# Patient Record
Sex: FEMALE | Race: WHITE | Employment: FULL TIME | URBAN - METROPOLITAN AREA
[De-identification: names, ages, dates, MRNs, and addresses within clinical notes are randomized per-mention and may not be internally consistent; named-entity substitution may affect disease eponyms.]

---

## 2017-03-22 ENCOUNTER — OFFICE VISIT (OUTPATIENT)
Dept: ENT CLINIC | Age: 32
End: 2017-03-22

## 2017-03-22 VITALS
HEART RATE: 89 BPM | WEIGHT: 145 LBS | HEIGHT: 67 IN | DIASTOLIC BLOOD PRESSURE: 80 MMHG | BODY MASS INDEX: 22.76 KG/M2 | SYSTOLIC BLOOD PRESSURE: 115 MMHG

## 2017-03-22 DIAGNOSIS — M79.18 MYOFACIAL MUSCLE PAIN: Primary | ICD-10-CM

## 2017-03-22 DIAGNOSIS — M79.18 MYOFASCIAL PAIN: Primary | ICD-10-CM

## 2017-03-22 DIAGNOSIS — H92.09 OTALGIA, UNSPECIFIED LATERALITY: ICD-10-CM

## 2017-03-22 PROCEDURE — 99214 OFFICE O/P EST MOD 30 MIN: CPT | Performed by: OTOLARYNGOLOGY

## 2017-03-22 PROCEDURE — 92567 TYMPANOMETRY: CPT | Performed by: AUDIOLOGIST

## 2017-03-22 PROCEDURE — 92557 COMPREHENSIVE HEARING TEST: CPT | Performed by: AUDIOLOGIST

## 2017-03-22 RX ORDER — DEXTROAMPHETAMINE SACCHARATE, AMPHETAMINE ASPARTATE, DEXTROAMPHETAMINE SULFATE AND AMPHETAMINE SULFATE 7.5; 7.5; 7.5; 7.5 MG/1; MG/1; MG/1; MG/1
TABLET ORAL
COMMUNITY
Start: 2017-03-14 | End: 2021-09-30

## 2018-08-16 ENCOUNTER — HOSPITAL ENCOUNTER (OUTPATIENT)
Dept: OTHER | Age: 33
Discharge: HOME | End: 2018-08-16
Attending: NURSE PRACTITIONER | Admitting: NURSE PRACTITIONER

## 2018-08-16 DIAGNOSIS — S09.92XS INJURY OF NOSE, SEQUELA: ICD-10-CM

## 2018-08-27 ENCOUNTER — OFFICE VISIT (OUTPATIENT)
Dept: ENT CLINIC | Age: 33
End: 2018-08-27

## 2018-08-27 VITALS — HEART RATE: 76 BPM | SYSTOLIC BLOOD PRESSURE: 110 MMHG | DIASTOLIC BLOOD PRESSURE: 68 MMHG | OXYGEN SATURATION: 98 %

## 2018-08-27 DIAGNOSIS — J34.89 NASAL OBSTRUCTION: Primary | ICD-10-CM

## 2018-08-27 PROCEDURE — 96372 THER/PROPH/DIAG INJ SC/IM: CPT | Performed by: OTOLARYNGOLOGY

## 2018-08-27 PROCEDURE — 1036F TOBACCO NON-USER: CPT | Performed by: OTOLARYNGOLOGY

## 2018-08-27 PROCEDURE — G8421 BMI NOT CALCULATED: HCPCS | Performed by: OTOLARYNGOLOGY

## 2018-08-27 PROCEDURE — G8427 DOCREV CUR MEDS BY ELIG CLIN: HCPCS | Performed by: OTOLARYNGOLOGY

## 2018-08-27 PROCEDURE — 99213 OFFICE O/P EST LOW 20 MIN: CPT | Performed by: OTOLARYNGOLOGY

## 2018-08-27 RX ORDER — ALPRAZOLAM 1 MG/1
1 TABLET ORAL NIGHTLY PRN
COMMUNITY
End: 2021-09-30

## 2018-08-27 RX ORDER — METHYLPREDNISOLONE ACETATE 40 MG/ML
40 INJECTION, SUSPENSION INTRA-ARTICULAR; INTRALESIONAL; INTRAMUSCULAR; SOFT TISSUE ONCE
Status: COMPLETED | OUTPATIENT
Start: 2018-08-27 | End: 2018-08-27

## 2018-08-27 RX ADMIN — METHYLPREDNISOLONE ACETATE 40 MG: 40 INJECTION, SUSPENSION INTRA-ARTICULAR; INTRALESIONAL; INTRAMUSCULAR; SOFT TISSUE at 15:54

## 2018-08-28 NOTE — PROGRESS NOTES
The patient is a generally healthy 72-year-old who sustained blunt trauma to the midface approximate 2 weeks ago. Approximately 4 days after the injury, when she continued to notice right-sided nasal blockage, she was seen in an ER setting. Radiographs at that time showed that her nasal bone was intact. No further treatment was recommended at that time. The patient has no past rhinologic history either medical or surgical.  There had been no obstructive symptoms prior to the trauma. Since that time however she feels a block sensation on the right that is constant and not related to environmental or position. She has not used over-the-counter medications either oral or topical.  There has been no change in her sense of olfaction. She is also complaining of right sided facial pressure, particularly in the region of the nasal maxillary groove. She denies any autophony or eye symptoms. She does not smoke and is not exposed to fumes or chemicals. There has a past history of TMJ discomfort. This has not been present to her knowledge for approximately a year. She denies any related dental disease. Exam:    The patient appears well developed and well nourished.; oriented to person, place, and time. The head is normocephalic and atraumatic; no facial scars or weakness are noted. The facial skeleton is normal.The voice has a normal quality and tonality to it. Eyes: Conjunctivae and lids normal. Full EOM. The skin is warm and dry. No pallor. The pinnae are normal in appearance. Otoscopy reveals clear ear canals without cerumen or keratin debris. Both eardrums are normal with good aeration of the middle ear space. There is no attic retraction, and a normal light reflex. The external nose is unremarkable including the dorsum, columella, nasion, and alae. The turbinates respond well to vasoconstriction. The middle and inferior meatuses appeared clear.  There is no polyp,

## 2018-09-04 ENCOUNTER — TELEPHONE (OUTPATIENT)
Dept: ENT CLINIC | Age: 33
End: 2018-09-04

## 2018-09-04 DIAGNOSIS — J34.89 NASAL OBSTRUCTION: Primary | ICD-10-CM

## 2018-09-04 NOTE — TELEPHONE ENCOUNTER
Patient was told to call back in a week regarding her breathing out of her right nostril. She was seen in the office on  8/27/18. She states her breathing is not any better. Please advise.

## 2018-09-05 ENCOUNTER — TELEPHONE (OUTPATIENT)
Dept: ENT CLINIC | Age: 33
End: 2018-09-05

## 2018-09-06 NOTE — TELEPHONE ENCOUNTER
Attempted to call patient. Left message on machine that CAT of nose and sinus needs to be done, order in chart. Central Scheduling phone number given to call and schedule above test.    Call office with questions.

## 2021-04-20 ENCOUNTER — HOSPITAL ENCOUNTER (OUTPATIENT)
Age: 36
Discharge: HOME | End: 2021-04-20
Payer: COMMERCIAL

## 2021-04-20 ENCOUNTER — HOSPITAL ENCOUNTER (OUTPATIENT)
Dept: GENERAL RADIOLOGY | Age: 36
Discharge: HOME | End: 2021-04-20
Payer: COMMERCIAL

## 2021-04-20 DIAGNOSIS — R06.00 DYSPNEA, UNSPECIFIED TYPE: ICD-10-CM

## 2021-04-20 PROCEDURE — 71046 X-RAY EXAM CHEST 2 VIEWS: CPT

## 2021-09-27 ENCOUNTER — HOSPITAL ENCOUNTER (OUTPATIENT)
Dept: ULTRASOUND IMAGING | Age: 36
Discharge: HOME | End: 2021-09-27
Payer: COMMERCIAL

## 2021-09-27 ENCOUNTER — HOSPITAL ENCOUNTER (EMERGENCY)
Age: 36
Discharge: HOME | End: 2021-09-28
Attending: EMERGENCY MEDICINE
Payer: COMMERCIAL

## 2021-09-27 DIAGNOSIS — R10.9 ABDOMINAL PAIN, UNSPECIFIED ABDOMINAL LOCATION: ICD-10-CM

## 2021-09-27 DIAGNOSIS — R10.13 ABDOMINAL PAIN, EPIGASTRIC: Primary | ICD-10-CM

## 2021-09-27 LAB
A/G RATIO: 1.6 (ref 1.1–2.2)
ALBUMIN SERPL-MCNC: 4.6 G/DL (ref 3.4–5)
ALP BLD-CCNC: 65 U/L (ref 40–129)
ALT SERPL-CCNC: 17 U/L (ref 10–40)
ANION GAP SERPL CALCULATED.3IONS-SCNC: 13 MMOL/L (ref 3–16)
AST SERPL-CCNC: 19 U/L (ref 15–37)
BASOPHILS ABSOLUTE: 0.1 K/UL (ref 0–0.2)
BASOPHILS RELATIVE PERCENT: 0.7 %
BILIRUB SERPL-MCNC: 0.4 MG/DL (ref 0–1)
BILIRUBIN URINE: ABNORMAL
BLOOD, URINE: NEGATIVE
BUN BLDV-MCNC: 10 MG/DL (ref 7–20)
CALCIUM SERPL-MCNC: 9.5 MG/DL (ref 8.3–10.6)
CHLORIDE BLD-SCNC: 103 MMOL/L (ref 99–110)
CLARITY: CLEAR
CO2: 22 MMOL/L (ref 21–32)
COLOR: YELLOW
CREAT SERPL-MCNC: 0.7 MG/DL (ref 0.6–1.1)
EOSINOPHILS ABSOLUTE: 1.4 K/UL (ref 0–0.6)
EOSINOPHILS RELATIVE PERCENT: 12 %
GFR AFRICAN AMERICAN: >60
GFR NON-AFRICAN AMERICAN: >60
GLOBULIN: 2.9 G/DL
GLUCOSE BLD-MCNC: 106 MG/DL (ref 70–99)
GLUCOSE URINE: NEGATIVE MG/DL
HCG QUALITATIVE: NEGATIVE
HCT VFR BLD CALC: 41.7 % (ref 36–48)
HEMOGLOBIN: 14 G/DL (ref 12–16)
KETONES, URINE: ABNORMAL MG/DL
LEUKOCYTE ESTERASE, URINE: NEGATIVE
LIPASE: 39 U/L (ref 13–60)
LYMPHOCYTES ABSOLUTE: 4.1 K/UL (ref 1–5.1)
LYMPHOCYTES RELATIVE PERCENT: 34.4 %
MCH RBC QN AUTO: 29.6 PG (ref 26–34)
MCHC RBC AUTO-ENTMCNC: 33.6 G/DL (ref 31–36)
MCV RBC AUTO: 88.2 FL (ref 80–100)
MICROSCOPIC EXAMINATION: ABNORMAL
MONOCYTES ABSOLUTE: 0.9 K/UL (ref 0–1.3)
MONOCYTES RELATIVE PERCENT: 7.7 %
NEUTROPHILS ABSOLUTE: 5.3 K/UL (ref 1.7–7.7)
NEUTROPHILS RELATIVE PERCENT: 45.2 %
NITRITE, URINE: NEGATIVE
PDW BLD-RTO: 13.8 % (ref 12.4–15.4)
PH UA: 6 (ref 5–8)
PLATELET # BLD: 348 K/UL (ref 135–450)
PMV BLD AUTO: 7.5 FL (ref 5–10.5)
POTASSIUM REFLEX MAGNESIUM: 3.8 MMOL/L (ref 3.5–5.1)
PROTEIN UA: NEGATIVE MG/DL
RBC # BLD: 4.73 M/UL (ref 4–5.2)
SODIUM BLD-SCNC: 138 MMOL/L (ref 136–145)
SPECIFIC GRAVITY UA: 1.03 (ref 1–1.03)
TOTAL PROTEIN: 7.5 G/DL (ref 6.4–8.2)
URINE REFLEX TO CULTURE: ABNORMAL
URINE TYPE: ABNORMAL
UROBILINOGEN, URINE: 0.2 E.U./DL
WBC # BLD: 11.8 K/UL (ref 4–11)

## 2021-09-27 PROCEDURE — 81003 URINALYSIS AUTO W/O SCOPE: CPT

## 2021-09-27 PROCEDURE — 2500000003 HC RX 250 WO HCPCS: Performed by: EMERGENCY MEDICINE

## 2021-09-27 PROCEDURE — 76700 US EXAM ABDOM COMPLETE: CPT

## 2021-09-27 PROCEDURE — 80053 COMPREHEN METABOLIC PANEL: CPT

## 2021-09-27 PROCEDURE — 96374 THER/PROPH/DIAG INJ IV PUSH: CPT

## 2021-09-27 PROCEDURE — 84703 CHORIONIC GONADOTROPIN ASSAY: CPT

## 2021-09-27 PROCEDURE — 2580000003 HC RX 258: Performed by: EMERGENCY MEDICINE

## 2021-09-27 PROCEDURE — 85025 COMPLETE CBC W/AUTO DIFF WBC: CPT

## 2021-09-27 PROCEDURE — 99282 EMERGENCY DEPT VISIT SF MDM: CPT

## 2021-09-27 PROCEDURE — 6360000002 HC RX W HCPCS: Performed by: EMERGENCY MEDICINE

## 2021-09-27 PROCEDURE — 96375 TX/PRO/DX INJ NEW DRUG ADDON: CPT

## 2021-09-27 PROCEDURE — 83690 ASSAY OF LIPASE: CPT

## 2021-09-27 RX ORDER — MORPHINE SULFATE 4 MG/ML
4 INJECTION, SOLUTION INTRAMUSCULAR; INTRAVENOUS ONCE
Status: COMPLETED | OUTPATIENT
Start: 2021-09-27 | End: 2021-09-27

## 2021-09-27 RX ORDER — 0.9 % SODIUM CHLORIDE 0.9 %
1000 INTRAVENOUS SOLUTION INTRAVENOUS ONCE
Status: COMPLETED | OUTPATIENT
Start: 2021-09-27 | End: 2021-09-28

## 2021-09-27 RX ORDER — ONDANSETRON 2 MG/ML
4 INJECTION INTRAMUSCULAR; INTRAVENOUS ONCE
Status: COMPLETED | OUTPATIENT
Start: 2021-09-27 | End: 2021-09-27

## 2021-09-27 RX ADMIN — ONDANSETRON 4 MG: 2 INJECTION INTRAMUSCULAR; INTRAVENOUS at 23:41

## 2021-09-27 RX ADMIN — FAMOTIDINE 20 MG: 10 INJECTION, SOLUTION INTRAVENOUS at 23:41

## 2021-09-27 RX ADMIN — MORPHINE SULFATE 4 MG: 4 INJECTION, SOLUTION INTRAMUSCULAR; INTRAVENOUS at 23:41

## 2021-09-27 RX ADMIN — SODIUM CHLORIDE 1000 ML: 9 INJECTION, SOLUTION INTRAVENOUS at 23:40

## 2021-09-27 ASSESSMENT — PAIN DESCRIPTION - LOCATION: LOCATION: ABDOMEN

## 2021-09-27 ASSESSMENT — PAIN SCALES - GENERAL
PAINLEVEL_OUTOF10: 6
PAINLEVEL_OUTOF10: 4

## 2021-09-27 ASSESSMENT — ENCOUNTER SYMPTOMS
CONSTIPATION: 0
COLOR CHANGE: 0
DIARRHEA: 0
SHORTNESS OF BREATH: 0
EYE PAIN: 0
NAUSEA: 1
CHEST TIGHTNESS: 0
COUGH: 0
VOMITING: 1
ABDOMINAL PAIN: 1
BLOOD IN STOOL: 0

## 2021-09-27 ASSESSMENT — PAIN - FUNCTIONAL ASSESSMENT: PAIN_FUNCTIONAL_ASSESSMENT: ACTIVITIES ARE NOT PREVENTED

## 2021-09-27 ASSESSMENT — PAIN DESCRIPTION - ORIENTATION: ORIENTATION: MID

## 2021-09-27 ASSESSMENT — PAIN DESCRIPTION - ONSET: ONSET: ON-GOING

## 2021-09-27 ASSESSMENT — PAIN DESCRIPTION - DESCRIPTORS: DESCRIPTORS: ACHING

## 2021-09-27 ASSESSMENT — PAIN DESCRIPTION - FREQUENCY: FREQUENCY: CONTINUOUS

## 2021-09-27 ASSESSMENT — PAIN DESCRIPTION - PAIN TYPE: TYPE: VISCERAL PAIN

## 2021-09-27 ASSESSMENT — PAIN DESCRIPTION - PROGRESSION: CLINICAL_PROGRESSION: NOT CHANGED

## 2021-09-28 VITALS
SYSTOLIC BLOOD PRESSURE: 106 MMHG | HEART RATE: 81 BPM | OXYGEN SATURATION: 100 % | TEMPERATURE: 98.4 F | WEIGHT: 146.83 LBS | BODY MASS INDEX: 23.04 KG/M2 | DIASTOLIC BLOOD PRESSURE: 71 MMHG | HEIGHT: 67 IN | RESPIRATION RATE: 19 BRPM

## 2021-09-28 RX ORDER — HYDROCODONE BITARTRATE AND ACETAMINOPHEN 5; 325 MG/1; MG/1
1 TABLET ORAL EVERY 4 HOURS PRN
Qty: 18 TABLET | Refills: 0 | Status: SHIPPED | OUTPATIENT
Start: 2021-09-28 | End: 2021-10-01

## 2021-09-28 RX ORDER — ONDANSETRON 4 MG/1
4 TABLET, FILM COATED ORAL EVERY 8 HOURS PRN
Qty: 20 TABLET | Refills: 0 | Status: SHIPPED | OUTPATIENT
Start: 2021-09-28 | End: 2021-10-29

## 2021-09-28 NOTE — ED PROVIDER NOTES
eMERGENCY dEPARTMENT eNCOUnter      Pt Name: Krista Fulton  MRN: [id number]  [place] 1985  Date of evaluation: 9/27/2021  Provider: Christine Aguilar MD     [address] [place] [address]       Chief Complaint   Patient presents with    Abdominal Pain     onset 6 days ago.  n/v denies diarrhea. pt wtih US this am.  pt here for continued eval          HISTORY OF PRESENT ILLNESS   (Location/Symptom, Timing/Onset,Context/Setting, Quality, Duration, Modifying Factors, Severity) Note limiting factors. Alireza Bazzi is a 32 y/o female with PMH of PCOS who has had abdominal pain for the past 6 days. Her pain became excruciating today and she states she cannot eat or drink anything. She saw her PCP this morning and tested positive for h.pylori. Her abdominal pain is in the epigastric area, is burning in nature, and slightly shoots to her back. Pain is exacerbated about an hour after eating food. She states nothing makes it better. At rest her pain is a 6/10 and with food its a 10/10. She has surgical history of her right ovary being removed. She denies diarrhea, constipation, fever, chest pain, and shortness of breath. She does not drink alcohol regularly, stating she only has 6 drinks every month, does not smoke, and has no history of GB or kidney stones. Nursing Notes were reviewed. REVIEW OFSYSTEMS    (2+ for level 4; 10+ for level 5)   Review of Systems   Constitutional: Positive for appetite change. Negative for fever. HENT: Negative for congestion, ear discharge and ear pain. Eyes: Negative for pain and visual disturbance. Respiratory: Negative for cough, chest tightness and shortness of breath. Cardiovascular: Negative for chest pain. Gastrointestinal: Positive for abdominal pain, nausea and vomiting. Negative for blood in stool, constipation and diarrhea. Denies hematemesis    Genitourinary: Negative for difficulty urinating, dyspareunia, menstrual problem, pelvic pain and vaginal bleeding.    Skin: Negative for color change and rash. PAST MEDICAL HISTORY     Past Medical History:   Diagnosis Date    Anxiety     Asthma        SURGICAL HISTORY       Past Surgical History:   Procedure Laterality Date    OVARY REMOVAL         CURRENT MEDICATIONS       Discharge Medication List as of 9/28/2021  1:14 AM      CONTINUE these medications which have NOT CHANGED    Details   ALPRAZolam (XANAX) 1 MG tablet Take 1 mg by mouth nightly as needed for Sleep. Agathadeanna Bensonisiss Historical Med      amphetamine-dextroamphetamine (ADDERALL) 30 MG tablet . Historical Med             ALLERGIES     Ceclor [cefaclor], Cephalosporins, and Codeine    FAMILY HISTORY     No family history on file. SOCIAL HISTORY       Social History     Socioeconomic History    Marital status:      Spouse name: Not on file    Number of children: Not on file    Years of education: Not on file    Highest education level: Not on file   Occupational History    Not on file   Tobacco Use    Smoking status: Former Smoker    Smokeless tobacco: Never Used    Tobacco comment: when pt was 20    Substance and Sexual Activity    Alcohol use: Yes     Comment: occasional    Drug use: No    Sexual activity: Not on file   Other Topics Concern    Not on file   Social History Narrative    Not on file     Social Determinants of Health     Financial Resource Strain:     Difficulty of Paying Living Expenses:    Food Insecurity:     Worried About [address] in the Last Year:     [address] in the Last Year:    Transportation Needs:     Lack of Transportation (Medical):      Lack of Transportation (Non-Medical):    Physical Activity:     Days of Exercise per Week:     Minutes of Exercise per Session:    Stress:     Feeling of Stress :    Social Connections:     Frequency of Communication with Friends and Family:     Frequency of Social Gatherings with Friends and Family:     Attends Christianity Services:     Active Member of Clubs or Organizations:     Attends Club or Organization Meetings:     Marital Status:    Intimate Partner Violence:     Fear of Current or Ex-Partner:     Emotionally Abused:     Physically Abused:     Sexually Abused:        SCREENINGS           PHYSICAL EXAM    (up to 7 for level 4, 8 or more for level 5)     ED Triage Vitals   BP Temp Temp Source Pulse Resp SpO2 Height Weight   09/27/21 1851 09/27/21 1851 09/27/21 1851 09/27/21 1851 09/27/21 1851 09/27/21 1851 09/27/21 1845 09/27/21 1845   119/88 98.9 °F (37.2 °C) Tympanic 84 17 99 % 5' 7\" (1.702 m) 146 lb 13.2 oz (66.6 kg)       Physical Exam  Constitutional:       Appearance: She is normal weight. Cardiovascular:      Rate and Rhythm: Normal rate and regular rhythm. Heart sounds: Normal heart sounds. Pulmonary:      Effort: Pulmonary effort is normal. No respiratory distress. Breath sounds: Normal breath sounds. No wheezing. Abdominal:      General: Bowel sounds are normal. There is no distension or abdominal bruit. There are no signs of injury. Palpations: Abdomen is soft. There is no splenomegaly or mass. Tenderness: There is abdominal tenderness in the right upper quadrant, epigastric area and left upper quadrant. There is no guarding or rebound. Negative signs include Hardy's sign, Rovsing's sign, McBurney's sign, psoas sign and obturator sign. Genitourinary:     Uterus: Normal. Not tender. Adnexa: Right adnexa normal.        Right: No mass or tenderness. Left: No mass or tenderness. Skin:     General: Skin is warm and dry. Capillary Refill: Capillary refill takes less than 2 seconds. Neurological:      Mental Status: She is alert and oriented to person, place, and time. Psychiatric:         Mood and Affect: Mood normal.         Behavior: Behavior normal.             DIAGNOSTIC RESULTS     EKG (Per Emergency Physician):       RADIOLOGY (Per Emergency Physician):        Interpretation per the Radiologist below, if available at the time of this note:  No results found. ED BEDSIDE ULTRASOUND:   Performed by ED Physician - none    LABS:  Labs Reviewed   CBC WITH AUTO DIFFERENTIAL - Abnormal; Notable for the following components:       Result Value    WBC 11.8 (*)     Eosinophils Absolute 1.4 (*)     All other components within normal limits    Narrative:     Performed at:  [address] [facility] [place] [address]   Phone [phone]   COMPREHENSIVE METABOLIC PANEL W/ REFLEX TO MG FOR LOW K - Abnormal; Notable for the following components:    Glucose 106 (*)     All other components within normal limits    Narrative:     Performed at:  [address] [place] [address]   Phone [phone]   URINE RT REFLEX TO CULTURE - Abnormal; Notable for the following components:    Bilirubin Urine SMALL (*)     Ketones, Urine TRACE (*)     All other components within normal limits    Narrative:     Performed at:  [address] [facility] [place] [address]   Phone [phone]   HCG, SERUM, QUALITATIVE    Narrative:     Performed at:  [address] [facility] [place] [address]   Phone [phone]   LIPASE    Narrative:     Performed at:  [address] [place] [address] [place] [address]   Phone [phone]        All other labs were within normal range or not returned as of this dictation.       Procedures      EMERGENCY DEPARTMENT COURSE and DIFFERENTIAL DIAGNOSIS/MDM:   Vitals:    Vitals:    09/27/21 1845 09/27/21 1851 09/28/21 0108   BP:  119/88 106/71   Pulse:  84 81   Resp:  17 19   Temp:  98.9 °F (37.2 °C) 98.4 °F (36.9 °C)   TempSrc:  Tympanic    SpO2:  99% 100%   Weight: 146 lb 13.2 oz (66.6 kg)     Height: 5' 7\" (1.702 m)         Medications   0.9 % sodium chloride bolus (0 mLs IntraVENous Stopped 9/28/21 0107)   ondansetron (ZOFRAN) injection 4 mg (4 mg IntraVENous Given 9/27/21 2341)   morphine (PF) injection 4 mg (4 mg IntraVENous Given 9/27/21 2341)   famotidine (PEPCID) injection 20 mg (20 mg IntraVENous Given 9/27/21 2341)       [facility]. Patient is a 72-year-old female with history of polycystic ovarian disease presents with right upper quadrant abdominal pain. Its been going on for several days but seen by family physician diagnosed with H pylori and was placed on antibiotics patient had ultrasound which shows some sludge in the gallbladder but no stones no evidence of acute cholecystitis. Has a hemangioma of the liver. Patient came in with nausea vomiting unable to eat or drink. Patient was given IV fluids IV Zofran and pain medication with relief. Patient tolerating p.o. fluids and will be discharged. REVAL:         CRITICAL CARE TIME   Total CriticalCare time was 0 minutes, excluding separately reportable procedures. There was a high probability of clinically significant/life threatening deterioration in the patient's condition which required my urgent intervention. CONSULTS:  None    PROCEDURES:  Unless otherwise noted below, none     [unfilled]    FINAL IMPRESSION      1. Abdominal pain, epigastric          DISPOSITION/PLAN   DISPOSITION        PATIENT REFERRED TO:  Salinas Menon, APRN - CNP  [address] [place]  [phone]    Schedule an appointment as soon as possible for a visit in 1 week  If symptoms worsen    Khalif Vance MD  [address] [place] [address]  [phone]    Schedule an appointment as soon as possible for a visit in 2 days  If symptoms worsen      DISCHARGE MEDICATIONS:  Discharge Medication List as of 9/28/2021  1:14 AM      START taking these medications    Details   ondansetron (ZOFRAN) 4 MG tablet Take 1 tablet by mouth every 8 hours as needed for Nausea, Disp-20 tablet, R-0Print HYDROcodone-acetaminophen (NORCO) 5-325 MG per tablet Take 1 tablet by mouth every 4 hours as needed for Pain for up to 3 days. Intended supply: 3 days. Take lowest dose possible to manage pain, Disp-18 tablet, R-0Print                (Please note:  Portions of this note were completed with a voice recognition program.Efforts were made to edit the dictations but occasionally words and phrases are mis-transcribed.)  Form v2016. J.5-cn    David MARSHALL MD (electronically signed)  Emergency Medicine Provider        Joel James MD  09/30/21 7348

## 2021-09-28 NOTE — ED NOTES
Pt here with CC of mid abdominal pain that has been intermittent over the past week. But has worsened over the past 2-3 days and become more consistent. States that it does worsen after eating. She has been vomiting, no diarrhea.     Had US this AM.    ED MD @ bedside      Archana Morgan RN  09/27/21 9279

## 2021-09-30 ENCOUNTER — HOSPITAL ENCOUNTER (OUTPATIENT)
Age: 36
Setting detail: OBSERVATION
Discharge: HOME | End: 2021-10-01
Attending: EMERGENCY MEDICINE | Admitting: SURGERY
Payer: COMMERCIAL

## 2021-09-30 ENCOUNTER — APPOINTMENT (OUTPATIENT)
Dept: CT IMAGING | Age: 36
End: 2021-09-30
Payer: COMMERCIAL

## 2021-09-30 ENCOUNTER — APPOINTMENT (OUTPATIENT)
Dept: ULTRASOUND IMAGING | Age: 36
End: 2021-09-30
Payer: COMMERCIAL

## 2021-09-30 DIAGNOSIS — R10.11 RIGHT UPPER QUADRANT ABDOMINAL PAIN: Primary | ICD-10-CM

## 2021-09-30 DIAGNOSIS — K81.9 CHOLECYSTITIS: ICD-10-CM

## 2021-09-30 LAB
ALBUMIN SERPL-MCNC: 4.9 G/DL (ref 3.4–5)
ALP BLD-CCNC: 63 U/L (ref 40–129)
ALT SERPL-CCNC: 14 U/L (ref 10–40)
ANION GAP SERPL CALCULATED.3IONS-SCNC: 16 MMOL/L (ref 3–16)
AST SERPL-CCNC: 14 U/L (ref 15–37)
BASOPHILS ABSOLUTE: 0 K/UL (ref 0–0.2)
BASOPHILS RELATIVE PERCENT: 0.5 %
BILIRUB SERPL-MCNC: 0.6 MG/DL (ref 0–1)
BILIRUBIN DIRECT: <0.2 MG/DL (ref 0–0.3)
BILIRUBIN URINE: NEGATIVE
BILIRUBIN, INDIRECT: ABNORMAL MG/DL (ref 0–1)
BLOOD, URINE: NEGATIVE
BUN BLDV-MCNC: 10 MG/DL (ref 7–20)
CALCIUM SERPL-MCNC: 9.8 MG/DL (ref 8.3–10.6)
CHLORIDE BLD-SCNC: 102 MMOL/L (ref 99–110)
CLARITY: CLEAR
CO2: 21 MMOL/L (ref 21–32)
COLOR: YELLOW
CREAT SERPL-MCNC: 0.8 MG/DL (ref 0.6–1.1)
EKG ATRIAL RATE: 94 BPM
EKG DIAGNOSIS: NORMAL
EKG P AXIS: 69 DEGREES
EKG P-R INTERVAL: 126 MS
EKG Q-T INTERVAL: 330 MS
EKG QRS DURATION: 86 MS
EKG QTC CALCULATION (BAZETT): 412 MS
EKG R AXIS: 82 DEGREES
EKG T AXIS: 56 DEGREES
EKG VENTRICULAR RATE: 94 BPM
EOSINOPHILS ABSOLUTE: 1 K/UL (ref 0–0.6)
EOSINOPHILS RELATIVE PERCENT: 12.6 %
GFR AFRICAN AMERICAN: >60
GFR NON-AFRICAN AMERICAN: >60
GLUCOSE BLD-MCNC: 124 MG/DL (ref 70–99)
GLUCOSE URINE: NEGATIVE MG/DL
HCG(URINE) PREGNANCY TEST: NEGATIVE
HCT VFR BLD CALC: 41.4 % (ref 36–48)
HEMOGLOBIN: 13.9 G/DL (ref 12–16)
KETONES, URINE: 15 MG/DL
LACTIC ACID, SEPSIS: 1.8 MMOL/L (ref 0.4–1.9)
LEUKOCYTE ESTERASE, URINE: NEGATIVE
LIPASE: 58 U/L (ref 13–60)
LYMPHOCYTES ABSOLUTE: 2.4 K/UL (ref 1–5.1)
LYMPHOCYTES RELATIVE PERCENT: 31.4 %
MCH RBC QN AUTO: 29.7 PG (ref 26–34)
MCHC RBC AUTO-ENTMCNC: 33.5 G/DL (ref 31–36)
MCV RBC AUTO: 88.8 FL (ref 80–100)
MICROSCOPIC EXAMINATION: ABNORMAL
MONOCYTES ABSOLUTE: 0.4 K/UL (ref 0–1.3)
MONOCYTES RELATIVE PERCENT: 5.9 %
NEUTROPHILS ABSOLUTE: 3.8 K/UL (ref 1.7–7.7)
NEUTROPHILS RELATIVE PERCENT: 49.6 %
NITRITE, URINE: NEGATIVE
PDW BLD-RTO: 13.7 % (ref 12.4–15.4)
PH UA: 6.5 (ref 5–8)
PLATELET # BLD: 322 K/UL (ref 135–450)
PMV BLD AUTO: 7.4 FL (ref 5–10.5)
POTASSIUM REFLEX MAGNESIUM: 4.2 MMOL/L (ref 3.5–5.1)
PROTEIN UA: NEGATIVE MG/DL
RBC # BLD: 4.66 M/UL (ref 4–5.2)
SARS-COV-2, NAAT: NOT DETECTED
SODIUM BLD-SCNC: 139 MMOL/L (ref 136–145)
SPECIFIC GRAVITY UA: >1.03 (ref 1–1.03)
TOTAL PROTEIN: 8.1 G/DL (ref 6.4–8.2)
URINE REFLEX TO CULTURE: ABNORMAL
URINE TYPE: ABNORMAL
UROBILINOGEN, URINE: 0.2 E.U./DL
WBC # BLD: 7.6 K/UL (ref 4–11)

## 2021-09-30 PROCEDURE — 93005 ELECTROCARDIOGRAM TRACING: CPT | Performed by: PHYSICIAN ASSISTANT

## 2021-09-30 PROCEDURE — G0378 HOSPITAL OBSERVATION PER HR: HCPCS

## 2021-09-30 PROCEDURE — 93010 ELECTROCARDIOGRAM REPORT: CPT | Performed by: INTERNAL MEDICINE

## 2021-09-30 PROCEDURE — 83605 ASSAY OF LACTIC ACID: CPT

## 2021-09-30 PROCEDURE — 2580000003 HC RX 258: Performed by: PHYSICIAN ASSISTANT

## 2021-09-30 PROCEDURE — 6360000002 HC RX W HCPCS: Performed by: SURGERY

## 2021-09-30 PROCEDURE — 6360000002 HC RX W HCPCS: Performed by: PHYSICIAN ASSISTANT

## 2021-09-30 PROCEDURE — 36415 COLL VENOUS BLD VENIPUNCTURE: CPT

## 2021-09-30 PROCEDURE — 87635 SARS-COV-2 COVID-19 AMP PRB: CPT

## 2021-09-30 PROCEDURE — 96375 TX/PRO/DX INJ NEW DRUG ADDON: CPT

## 2021-09-30 PROCEDURE — 85025 COMPLETE CBC W/AUTO DIFF WBC: CPT

## 2021-09-30 PROCEDURE — 96376 TX/PRO/DX INJ SAME DRUG ADON: CPT

## 2021-09-30 PROCEDURE — 76830 TRANSVAGINAL US NON-OB: CPT

## 2021-09-30 PROCEDURE — 6360000004 HC RX CONTRAST MEDICATION: Performed by: EMERGENCY MEDICINE

## 2021-09-30 PROCEDURE — 83690 ASSAY OF LIPASE: CPT

## 2021-09-30 PROCEDURE — 84703 CHORIONIC GONADOTROPIN ASSAY: CPT

## 2021-09-30 PROCEDURE — 94150 VITAL CAPACITY TEST: CPT

## 2021-09-30 PROCEDURE — 2580000003 HC RX 258: Performed by: SURGERY

## 2021-09-30 PROCEDURE — 99284 EMERGENCY DEPT VISIT MOD MDM: CPT

## 2021-09-30 PROCEDURE — 6370000000 HC RX 637 (ALT 250 FOR IP): Performed by: SURGERY

## 2021-09-30 PROCEDURE — 80048 BASIC METABOLIC PNL TOTAL CA: CPT

## 2021-09-30 PROCEDURE — 76856 US EXAM PELVIC COMPLETE: CPT

## 2021-09-30 PROCEDURE — 81003 URINALYSIS AUTO W/O SCOPE: CPT

## 2021-09-30 PROCEDURE — 96374 THER/PROPH/DIAG INJ IV PUSH: CPT

## 2021-09-30 PROCEDURE — 80076 HEPATIC FUNCTION PANEL: CPT

## 2021-09-30 PROCEDURE — 74177 CT ABD & PELVIS W/CONTRAST: CPT

## 2021-09-30 RX ORDER — ACETAMINOPHEN 325 MG/1
650 TABLET ORAL EVERY 6 HOURS PRN
Status: DISCONTINUED | OUTPATIENT
Start: 2021-09-30 | End: 2021-10-01 | Stop reason: HOSPADM

## 2021-09-30 RX ORDER — CLARITHROMYCIN 500 MG/1
500 TABLET, COATED ORAL 2 TIMES DAILY
COMMUNITY
Start: 2021-09-27 | End: 2021-10-06

## 2021-09-30 RX ORDER — ONDANSETRON 2 MG/ML
4 INJECTION INTRAMUSCULAR; INTRAVENOUS EVERY 6 HOURS PRN
Status: DISCONTINUED | OUTPATIENT
Start: 2021-09-30 | End: 2021-10-01 | Stop reason: HOSPADM

## 2021-09-30 RX ORDER — CIPROFLOXACIN 2 MG/ML
400 INJECTION, SOLUTION INTRAVENOUS EVERY 12 HOURS
Status: DISCONTINUED | OUTPATIENT
Start: 2021-09-30 | End: 2021-10-01

## 2021-09-30 RX ORDER — METRONIDAZOLE 500 MG/1
500 TABLET ORAL 2 TIMES DAILY
COMMUNITY
Start: 2021-09-27 | End: 2021-10-04

## 2021-09-30 RX ORDER — SODIUM CHLORIDE 9 MG/ML
INJECTION, SOLUTION INTRAVENOUS CONTINUOUS
Status: DISCONTINUED | OUTPATIENT
Start: 2021-09-30 | End: 2021-10-01 | Stop reason: HOSPADM

## 2021-09-30 RX ORDER — OMEPRAZOLE 40 MG/1
40 CAPSULE, DELAYED RELEASE ORAL DAILY
COMMUNITY
Start: 2021-09-27 | End: 2021-10-29

## 2021-09-30 RX ORDER — OXYCODONE HYDROCHLORIDE 10 MG/1
10 TABLET ORAL EVERY 4 HOURS PRN
Status: DISCONTINUED | OUTPATIENT
Start: 2021-09-30 | End: 2021-10-01 | Stop reason: HOSPADM

## 2021-09-30 RX ORDER — OXYCODONE HYDROCHLORIDE 5 MG/1
5 TABLET ORAL EVERY 4 HOURS PRN
Status: DISCONTINUED | OUTPATIENT
Start: 2021-09-30 | End: 2021-10-01 | Stop reason: HOSPADM

## 2021-09-30 RX ORDER — SODIUM CHLORIDE 0.9 % (FLUSH) 0.9 %
5-40 SYRINGE (ML) INJECTION EVERY 12 HOURS SCHEDULED
Status: DISCONTINUED | OUTPATIENT
Start: 2021-09-30 | End: 2021-10-01 | Stop reason: HOSPADM

## 2021-09-30 RX ORDER — SODIUM CHLORIDE 9 MG/ML
25 INJECTION, SOLUTION INTRAVENOUS PRN
Status: DISCONTINUED | OUTPATIENT
Start: 2021-09-30 | End: 2021-10-01 | Stop reason: HOSPADM

## 2021-09-30 RX ORDER — ONDANSETRON 4 MG/1
4 TABLET, ORALLY DISINTEGRATING ORAL EVERY 8 HOURS PRN
Status: DISCONTINUED | OUTPATIENT
Start: 2021-09-30 | End: 2021-10-01 | Stop reason: HOSPADM

## 2021-09-30 RX ORDER — SODIUM CHLORIDE 0.9 % (FLUSH) 0.9 %
5-40 SYRINGE (ML) INJECTION PRN
Status: DISCONTINUED | OUTPATIENT
Start: 2021-09-30 | End: 2021-10-01 | Stop reason: HOSPADM

## 2021-09-30 RX ORDER — MORPHINE SULFATE 2 MG/ML
2 INJECTION, SOLUTION INTRAMUSCULAR; INTRAVENOUS
Status: DISCONTINUED | OUTPATIENT
Start: 2021-09-30 | End: 2021-10-01

## 2021-09-30 RX ORDER — 0.9 % SODIUM CHLORIDE 0.9 %
1000 INTRAVENOUS SOLUTION INTRAVENOUS ONCE
Status: COMPLETED | OUTPATIENT
Start: 2021-09-30 | End: 2021-09-30

## 2021-09-30 RX ORDER — ONDANSETRON 2 MG/ML
4 INJECTION INTRAMUSCULAR; INTRAVENOUS ONCE
Status: COMPLETED | OUTPATIENT
Start: 2021-09-30 | End: 2021-09-30

## 2021-09-30 RX ADMIN — HYDROMORPHONE HYDROCHLORIDE 1 MG: 1 INJECTION, SOLUTION INTRAMUSCULAR; INTRAVENOUS; SUBCUTANEOUS at 18:01

## 2021-09-30 RX ADMIN — MORPHINE SULFATE 2 MG: 2 INJECTION, SOLUTION INTRAMUSCULAR; INTRAVENOUS at 23:41

## 2021-09-30 RX ADMIN — Medication 10 ML: at 20:19

## 2021-09-30 RX ADMIN — SODIUM CHLORIDE: 9 INJECTION, SOLUTION INTRAVENOUS at 20:20

## 2021-09-30 RX ADMIN — MORPHINE SULFATE 2 MG: 2 INJECTION, SOLUTION INTRAMUSCULAR; INTRAVENOUS at 20:18

## 2021-09-30 RX ADMIN — HYDROMORPHONE HYDROCHLORIDE 1 MG: 1 INJECTION, SOLUTION INTRAMUSCULAR; INTRAVENOUS; SUBCUTANEOUS at 15:47

## 2021-09-30 RX ADMIN — ONDANSETRON 4 MG: 2 INJECTION INTRAMUSCULAR; INTRAVENOUS at 20:18

## 2021-09-30 RX ADMIN — CIPROFLOXACIN 400 MG: 2 INJECTION, SOLUTION INTRAVENOUS at 20:21

## 2021-09-30 RX ADMIN — ONDANSETRON 4 MG: 2 INJECTION INTRAMUSCULAR; INTRAVENOUS at 13:23

## 2021-09-30 RX ADMIN — SODIUM CHLORIDE 1000 ML: 9 INJECTION, SOLUTION INTRAVENOUS at 13:24

## 2021-09-30 RX ADMIN — HYDROMORPHONE HYDROCHLORIDE 1 MG: 1 INJECTION, SOLUTION INTRAMUSCULAR; INTRAVENOUS; SUBCUTANEOUS at 13:24

## 2021-09-30 RX ADMIN — OXYCODONE HYDROCHLORIDE 10 MG: 10 TABLET ORAL at 21:19

## 2021-09-30 RX ADMIN — IOPAMIDOL 75 ML: 755 INJECTION, SOLUTION INTRAVENOUS at 13:39

## 2021-09-30 ASSESSMENT — PAIN - FUNCTIONAL ASSESSMENT
PAIN_FUNCTIONAL_ASSESSMENT: ACTIVITIES ARE NOT PREVENTED
PAIN_FUNCTIONAL_ASSESSMENT: ACTIVITIES ARE NOT PREVENTED

## 2021-09-30 ASSESSMENT — PAIN DESCRIPTION - ONSET
ONSET: ON-GOING
ONSET: ON-GOING

## 2021-09-30 ASSESSMENT — PAIN SCALES - GENERAL
PAINLEVEL_OUTOF10: 10
PAINLEVEL_OUTOF10: 6
PAINLEVEL_OUTOF10: 0
PAINLEVEL_OUTOF10: 0
PAINLEVEL_OUTOF10: 6
PAINLEVEL_OUTOF10: 8
PAINLEVEL_OUTOF10: 9
PAINLEVEL_OUTOF10: 5
PAINLEVEL_OUTOF10: 9
PAINLEVEL_OUTOF10: 10
PAINLEVEL_OUTOF10: 9

## 2021-09-30 ASSESSMENT — PAIN DESCRIPTION - PAIN TYPE
TYPE: ACUTE PAIN

## 2021-09-30 ASSESSMENT — PAIN DESCRIPTION - LOCATION
LOCATION: ABDOMEN;BACK
LOCATION: ABDOMEN
LOCATION: ABDOMEN;BACK

## 2021-09-30 ASSESSMENT — PAIN DESCRIPTION - PROGRESSION
CLINICAL_PROGRESSION: RAPIDLY WORSENING
CLINICAL_PROGRESSION: GRADUALLY IMPROVING

## 2021-09-30 ASSESSMENT — PAIN DESCRIPTION - ORIENTATION
ORIENTATION: RIGHT
ORIENTATION: RIGHT

## 2021-09-30 ASSESSMENT — PAIN DESCRIPTION - DESCRIPTORS
DESCRIPTORS: DISCOMFORT;SHARP
DESCRIPTORS: SHARP

## 2021-09-30 ASSESSMENT — PAIN DESCRIPTION - DIRECTION: RADIATING_TOWARDS: BACK

## 2021-09-30 ASSESSMENT — PAIN DESCRIPTION - FREQUENCY
FREQUENCY: CONTINUOUS
FREQUENCY: CONTINUOUS

## 2021-09-30 NOTE — ED PROVIDER NOTES
[address]        Pt Name: Adry Doss  MRN: [id number]  [place] 1985  Date of evaluation: 9/30/2021  Provider: LAXMI Horvath  PCP: JOSHUA Olsen CNP  Note Started: 1:40 PM EDT     This patient was also seen and evaluated by Attending Physician Marissa Chauhan MD.    [address] [place] [address]       Chief Complaint   Patient presents with    Abdominal Pain     pt here two days ago. diagnosed with cholecystitis. supposed to have surgery monday, but worsened pain radiating to back now. unable to eat. emesis. no fevers. HISTORY OF PRESENT ILLNESS   (Location, Timing/Onset, Context/Setting, Quality, Duration, Modifying Factors, Severity, Associated Signs and Symptoms)  Note limiting factors. Chief Complaint: Right upper quadrant abdominal pain    Adry Doss is a 31 y.o. female who presents to the emergency department with complaint of severe right upper quadrant abdominal pain radiating to the back. Patient was seen in this emergency department 3 days ago for similar pain, had a work-up including right upper quadrant ultrasound that showed nothing acute, and she was discharged with a referral for general surgery. Patient says she called our surgery service and could not get scheduled for a visit until 4 days from today, so she tried elsewhere and was able to get to see another surgeon yesterday, who has scheduled her for a cholecystectomy in 4 days. She says that this afternoon the pain got dramatically worse, and she feels like she cannot breathe because of the pain. She says it seems to be expanded, now going into her epigastric area and into her back. Denies any relevant prior history or history of abdominal surgery other than for an oophorectomy. Denies chest pain, cough. Says she has vomited a few times and has not really eaten anything over the last couple of days. No diarrhea or constipation.   No recorded fever. Nursing Notes were all reviewed and agreed with or any disagreements were addressed in the HPI. REVIEW OF SYSTEMS    (2-9 systems for level 4, 10 or more for level 5)     Review of Systems    Positives and pertinent negatives as per HPI. PAST MEDICAL HISTORY     Past Medical History:   Diagnosis Date    Anxiety     Asthma        SURGICAL HISTORY     Past Surgical History:   Procedure Laterality Date    OVARY REMOVAL         CURRENTMEDICATIONS       Previous Medications    ALPRAZOLAM (XANAX) 1 MG TABLET    Take 1 mg by mouth nightly as needed for Sleep. .    AMPHETAMINE-DEXTROAMPHETAMINE (ADDERALL) 30 MG TABLET    . HYDROCODONE-ACETAMINOPHEN (NORCO) 5-325 MG PER TABLET    Take 1 tablet by mouth every 4 hours as needed for Pain for up to 3 days. Intended supply: 3 days. Take lowest dose possible to manage pain    ONDANSETRON (ZOFRAN) 4 MG TABLET    Take 1 tablet by mouth every 8 hours as needed for Nausea       ALLERGIES     Ceclor [cefaclor], Cephalosporins, and Codeine    FAMILYHISTORY     History reviewed. No pertinent family history. SOCIAL HISTORY       Social History     Tobacco Use    Smoking status: Former Smoker    Smokeless tobacco: Never Used    Tobacco comment: when pt was 20    Substance Use Topics    Alcohol use: Yes     Comment: occasional    Drug use: No       SCREENINGS           PHYSICAL EXAM    (up to 7 for level 4, 8 or more for level 5)     ED Triage Vitals [09/30/21 1244]   BP Temp Temp Source Pulse Resp SpO2 Height Weight   (!) 139/94 98.5 °F (36.9 °C) Oral 142 16 100 % 5' 7\" (1.702 m) 147 lb 7.8 oz (66.9 kg)       Physical Exam  Vitals and nursing note reviewed. Constitutional:       General: She is in acute distress. Appearance: Normal appearance. HENT:      Head: Normocephalic and atraumatic. Nose: Nose normal.   Eyes:      General:         Right eye: No discharge. Left eye: No discharge.    Pulmonary:      Effort: Pulmonary effort is normal. No respiratory distress. Abdominal:      General: Bowel sounds are normal. There is no distension. Palpations: Abdomen is soft. There is no mass. Tenderness: There is abdominal tenderness in the right upper quadrant. There is right CVA tenderness and guarding. There is no rebound. Musculoskeletal:         General: Normal range of motion. Cervical back: Normal range of motion. Skin:     General: Skin is warm and dry. Neurological:      General: No focal deficit present. Mental Status: She is alert and oriented to person, place, and time.    Psychiatric:         Mood and Affect: Mood normal.         Behavior: Behavior normal.         DIAGNOSTIC RESULTS   LABS:    Labs Reviewed   CBC WITH AUTO DIFFERENTIAL - Abnormal; Notable for the following components:       Result Value    Eosinophils Absolute 1.0 (*)     All other components within normal limits    Narrative:     Performed at:  [place] [address]   Phone [phone]   BASIC METABOLIC PANEL W/ REFLEX TO MG FOR LOW K - Abnormal; Notable for the following components:    Glucose 124 (*)     All other components within normal limits    Narrative:     Performed at:  [place] [address]   Phone [phone]   HEPATIC FUNCTION PANEL - Abnormal; Notable for the following components:    AST 14 (*)     All other components within normal limits    Narrative:     Performed at:  [place] [address]   Phone [phone]   URINE RT REFLEX TO CULTURE - Abnormal; Notable for the following components:    Ketones, Urine 15 (*)     All other components within normal limits    Narrative:     Performed at:  [place] [address]   Phone [phone]   CULTURE, BLOOD 1 CULTURE, BLOOD 2   COVID-19, RAPID   LIPASE    Narrative:     Performed at:  [facility]  [address] [place] [address]   Phone [phone]   LACTATE, SEPSIS    Narrative:     Performed at:  [facility]  [address] [facility] [address] [facility] [address] [facility] [place] [address]   Phone [phone]   PREGNANCY, URINE    Narrative:     Performed at:  [facility]  [address] [place] [facility] [address]   Phone [phone]   LACTATE, SEPSIS   HCG, SERUM, QUALITATIVE       When ordered only abnormal lab results are displayed. All other labs were within normal range or not returned as of this dictation. EKG: When ordered, EKG's are interpreted by the Emergency Department Physician in the absence of a cardiologist.  Please see their note for interpretation of EKG. RADIOLOGY:   Non-plain film images such as CT, Ultrasound and MRI are read by the radiologist. Plain radiographic images are visualized and preliminarily interpreted by the ED Provider with the below findings:    Interpretation per the Radiologist below, if available at the time of this note:    US NON OB TRANSVAGINAL   Preliminary Result   Dermoid cyst within the right ovary, consistent with the findings of   09/30/2021. Normal arterial and venous blood flow is identified within the   right ovary. No sonographic evidence of torsion. Please see below for   specific recommendations involving the dermoid cyst.      Small uterine fibroid measuring up to 2.7 cm. Normal sonographic appearance of the left ovary, uterus, and endometrium for   patient age. RECOMMENDATIONS:   Multiple ovarian cysts. Most severe: 2 cm dermoid ovarian cyst.  Recommend   pelvic MRI w/IV contrast. If not surgically resected, pelvic US follow-up   annually.    Reference: Radiology 2010 Sep;256(3):943-54         US PELVIS COMPLETE   Preliminary Result   Dermoid cyst on the right measuring 1.9 cm maximally as noted on the earlier   CT. Surgical consultation recommended. Continued surveillance is   recommended annually if not surgically resected. Posterior fundal fibroid measuring 2.7 cm. Thickened endometrial stripe   possibly related to the stage of menstruation. Reportedly previous left oophorectomy with possible remnant tissue on the   left. Physiologic free fluid in the cul-de-sac. RECOMMENDATIONS:   Multiple ovarian cysts. Most severe: 1.9 cm dermoid ovarian cyst.  Recommend   pelvic MRI w/IV contrast. If not surgically resected, pelvic US follow-up   annually. Reference: Radiology 2010 Sep;256(3):993-48         CT ABDOMEN PELVIS W IV CONTRAST Additional Contrast? None   Final Result   No CT evidence of acute intra-abdominal pathology. 1.8 cm right dermoid cyst.  Further evaluation with pelvic ultrasound and   gynecologic consult is recommended. US ABDOMEN COMPLETE    Result Date: 9/27/2021  EXAMINATION: COMPLETE ABDOMINAL ULTRASOUND 9/27/2021 12:42 pm COMPARISON: CT abdomen and pelvis 01/09/2013. HISTORY: ORDERING SYSTEM PROVIDED HISTORY: Abdominal pain, unspecified abdominal location FINDINGS: LIVER: Small hyperechoic lesion within the dome of the right hepatic lobe measuring 6 mm noted. The liver is otherwise unremarkable in appearance. No definite correlate noted on CT. BILIARY SYSTEM: Small amount of sludge noted within the gallbladder. No wall thickening or pericholecystic fluid noted. Patient reports no sonographic Hardy's sign. Common bile duct is within normal limits measuring 2.1 mm. KIDNEYS: The kidneys are unremarkable in appearance without evidence of hydronephrosis. PANCREAS: Visualized portions of the pancreas are unremarkable. SPLEEN: The spleen is unremarkable in appearance. Spleen is within normal limits in size. IVC: The IVC is patent. AORTA: Aorta is patent without aneurysm. Bifurcation is obscured.  OTHER: No evidence of ascites. Probable small 6 mm hemangioma within the liver in the absence of known malignancy. If additional imaging evaluation is clinically indicated MRI would be the test of choice to further evaluate. Small amount of sludge within the gallbladder without sonographic evidence of acute cholecystitis. CONSULTS:  Elías Naqvi CONSULT TO GENERAL SURGERY    PROCEDURES   Unless otherwise noted below, none. Procedures    EMERGENCY DEPARTMENT COURSE and DIFFERENTIAL DIAGNOSIS/MDM:   Vitals:    Vitals:    09/30/21 1328 09/30/21 1331 09/30/21 1346 09/30/21 1402   BP:  124/71 122/84 122/67   Pulse: 88 92 99 83   Resp: 16 19 17 14   Temp:       TempSrc:       SpO2: 100% 100% 100% 100%   Weight:       Height:           Patient was given the following medications:  Medications   0.9 % sodium chloride bolus (0 mLs IntraVENous Stopped 9/30/21 1544)   ondansetron (ZOFRAN) injection 4 mg (4 mg IntraVENous Given 9/30/21 1323)   HYDROmorphone (DILAUDID) injection 1 mg (1 mg IntraVENous Given 9/30/21 1324)   iopamidol (ISOVUE-370) 76 % injection 75 mL (75 mLs IntraVENous Given 9/30/21 1339)   HYDROmorphone (DILAUDID) injection 1 mg (1 mg IntraVENous Given 9/30/21 1547)           Patient presented significant pain, with a very high heart rate, but her heart rate stabilized in the ED after pain medication and IV fluids. Laboratory work-up showed no concerning abnormalities. CT scan of the abdomen pelvis with IV contrast showed a likely dermoid cyst on the right adnexa but no other acute findings. Subsequent transvaginal ultrasound showed the same. Given the patient's significant pain, clinical suggestions of cholecystitis, and prior finding of gallbladder sludge on ultrasound, Dr. Almas Washington consulted the general surgeon, Dr. Radha Mondragon, who agreed to accept and admit the patient. CRITICAL CARE TIME   N/A    FINAL IMPRESSION      1.  Right upper quadrant abdominal pain          DISPOSITION/PLAN DISPOSITION Decision To Admit 09/30/2021 04:42:22 PM      PATIENT REFERRED TO:  No follow-up provider specified.     DISCHARGE MEDICATIONS:  New Prescriptions    No medications on file       DISCONTINUED MEDICATIONS:  Discontinued Medications    No medications on file            (Please note that portions of this note were completed with a voice recognition program.  Efforts were made to edit the dictations but occasionally words are mis-transcribed.)    LAXMI Mart (electronically signed)       Lula Martma  09/30/21 4145

## 2021-09-30 NOTE — PROGRESS NOTES
Medication Reconciliation    List of medications for Nic aMyfield is currently taking is complete. Source of Information:   Epic records  Conversation with patient at bedside     Allergies  Allergy list not thoroughly reviewed with patient at this time  Allergies listed in Epic as follows: Ceclor [cefaclor], Cephalosporins, and Codeine     Current Medications:  No current facility-administered medications for this encounter. Current Outpatient Medications:     clarithromycin (BIAXIN) 500 MG tablet, Take 500 mg by mouth 2 times daily, Disp: , Rfl:     omeprazole (PRILOSEC) 40 MG delayed release capsule, Take 40 mg by mouth daily, Disp: , Rfl:     metroNIDAZOLE (FLAGYL) 500 MG tablet, Take 500 mg by mouth 2 times daily, Disp: , Rfl:     ondansetron (ZOFRAN) 4 MG tablet, Take 1 tablet by mouth every 8 hours as needed for Nausea, Disp: 20 tablet, Rfl: 0    HYDROcodone-acetaminophen (NORCO) 5-325 MG per tablet, Take 1 tablet by mouth every 4 hours as needed for Pain for up to 3 days. Intended supply: 3 days.  Take lowest dose possible to manage pain, Disp: 18 tablet, Rfl: 0    Notes Regarding Home Medications:   Patient  took only hydrocodone-acetaminophen prior to arrival to the emergency department  Patient was recently prescribed clarithromycin, metronidazole, and omeprazole for H pylori, has only been able to take a few doses due to frequent ER visits  Entered dosing as listed in patient's records, these likely need adjusted to be appropriate for H pylori triple therapy treatment      May President, [facility], PharmD   9/30/2021 5:16 PM

## 2021-09-30 NOTE — ED NOTES
Report called to 3 W RN to assume care, all questions answered. Pt to room 4116 via stretcher with [facility] ED medic. No signs of acute distress noted. Pt is alert and oriented x4. IV normal saline locked. Pt updated on plan of care.       Reva Pelletier RN  09/30/21 6934

## 2021-09-30 NOTE — ED PROVIDER NOTES
[facility]  eMERGENCY dEPARTMENT eNCOUnter   Physician Attestation    Pt Name: Augusto Quintanilla  MRN: [id number]  [place] 1985  Date of evaluation: 9/30/21        Physician Note:    I havepersonally performed and/or participated in the history, exam and medical decision making and agree with all pertinent clinical information. I have also reviewed and agree with the past medical, family and social historyunless otherwise noted. I have personally performed a face to face diagnostic evaluation onthis patient. I have reviewed the mid-levels findings and agree. History: This is a 53-year-old female who has been having abdominal pain since the end of last week. Monday she did get into her primary care provider. That provider ordered an ultrasound of the gallbladder which was done. It was for the most part unremarkable except for gallbladder sludge. Monday night she got in trouble with increasing pain and came to the emergency department here. We did some basic labs, gave her something for pain and she was given a general surgery referral.  She then called the general surgeon but could not get until next Tuesday. In the interim she got into a general surgeon at [facility] and she is scheduled for cholecystectomy on Monday. Today, again she has had severe increasing pain. For the most part it acts like gallbladder pain. It is right upper quadrant and it radiates to her back. She came in in severe pain with a heart rate of 140. We did a CT scan that really did not show any obvious pathology with the gallbladder but they did note a dermoid cyst on the right. In the past she has had a unilateral oophorectomy done for dermoid cyst probably on the left. Physical Exam  Vitals and nursing note reviewed. Constitutional:       General: She is in acute distress. Appearance: She is well-developed. She is not diaphoretic.       Comments: Prior to my evaluation of the patient she apparently did come in in acute distress. She was seen by the physician assistant. At the time of my evaluation she had been medicated so she was resting a lot more comfortably. HENT:      Head: Normocephalic and atraumatic. Right Ear: External ear normal.      Left Ear: External ear normal.   Eyes:      General: No scleral icterus. Right eye: No discharge. Left eye: No discharge. Conjunctiva/sclera: Conjunctivae normal.   Neck:      Trachea: No tracheal deviation. Pulmonary:      Effort: Pulmonary effort is normal. No respiratory distress. Breath sounds: No stridor. Abdominal:      Tenderness: There is abdominal tenderness in the right upper quadrant. There is no guarding or rebound. Positive signs include Hardy's sign. Comments: There is very mild right lower quadrant pain but the majority of her symptoms are right upper quadrant and the pain does radiate to her back when I palpate. Musculoskeletal:         General: Normal range of motion. Cervical back: Normal range of motion. Skin:     General: Skin is warm and dry. Neurological:      Mental Status: She is alert and oriented to person, place, and time. Coordination: Coordination normal.   Psychiatric:         Behavior: Behavior normal.       An EKG was done because she presented with a heart rate of 140. At the time that EKG was done however her heart rate had come down. This is visualized and preliminarily interpreted by myself. The rhythm is sinus at a rate of 94 with an axis of 82. There is an inverted P wave in V1 and V2 consistent with possible left atrial enlargement. Some tremor artifact affects interpretation. Otherwise intervals ST and T waves are unremarkable and within normal limits. No acute injury or acute ischemia.     US NON OB TRANSVAGINAL    Result Date: 9/30/2021  EXAMINATION: PELVIC ULTRASOUND 9/30/2021 TECHNIQUE: Transabdominal and transvaginal pelvic duplex duplex ultrasound using B-mode/gray scaled imaging, Doppler spectral analysis and color flow Doppler was obtained. COMPARISON: CT 09/30/2021 HISTORY: ORDERING SYSTEM PROVIDED HISTORY: RT ov cyst TECHNOLOGIST PROVIDED HISTORY: Reason for exam:->RT ov cyst FINDINGS: Measurements: Uterus: 9.1 x 6.1 x 5.1 cm Endometrial stripe: 19 mm Right Ovary:4.4 x 2.5 x 3.0 cm Left Ovary: 2.0 x 1.6 x 2.1 cm Ultrasound Findings: Uterus: The uterus is retroflexed. Posterior fundal fibroid measuring 2.7 x 1.9 x 2.7 cm. The uterine parenchyma is otherwise maintained. Endometrial stripe: Mild thickening of the endometrial stripe at 19 mm. Right Ovary: Solid echogenic mass in the right ovary measuring 1.9 x 1.6 x 1.7 cm consistent with a dermoid cyst as noted on the earlier CT. There is normal arterial and venous Doppler flow. Left Ovary: The left ovary is reportedly surgically absent. There is a cyst in the left adnexa measuring 1.5 x 1.2 x 1.2 cm with possible remnant ovarian tissue demonstrating normal Doppler flow. Free Fluid: Small amount of free pelvic fluid is present. Dermoid cyst on the right measuring 1.9 cm maximally as noted on the earlier CT. Surgical consultation recommended. Continued surveillance is recommended annually if not surgically resected. Posterior fundal fibroid measuring 2.7 cm. Thickened endometrial stripe possibly related to the stage of menstruation. Reportedly previous left oophorectomy with possible remnant tissue on the left. Physiologic free fluid in the cul-de-sac. RECOMMENDATIONS: Multiple ovarian cysts. Most severe: 1.9 cm dermoid ovarian cyst.  Recommend pelvic MRI w/IV contrast. If not surgically resected, pelvic US follow-up annually.  Reference: Radiology 2010 Sep;256(3):943-54     CT ABDOMEN PELVIS W IV CONTRAST Additional Contrast? None    Result Date: 9/30/2021  EXAMINATION: CT OF THE ABDOMEN AND PELVIS WITH CONTRAST 9/30/2021 1:38 pm TECHNIQUE: CT of the abdomen and pelvis was performed with the administration of intravenous contrast. Multiplanar reformatted images are provided for review. Dose modulation, iterative reconstruction, and/or weight based adjustment of the mA/kV was utilized to reduce the radiation dose to as low as reasonably achievable. COMPARISON: CT abdomen and pelvis dated 01/09/2013. HISTORY: ORDERING SYSTEM PROVIDED HISTORY: RUQ pain TECHNOLOGIST PROVIDED HISTORY: Additional Contrast?->None Reason for exam:->RUQ pain Decision Support Exception - unselect if not a suspected or confirmed emergency medical condition->Emergency Medical Condition (MA) Reason for Exam: RUQ pain, worsening x 3 days. Acuity: Acute Type of Exam: Initial Relevant Medical/Surgical History: hx right ovary removed. FINDINGS: CARDIOVASCULAR: The heart is normal in size. There is no pericardial effusion. The aorta and branch vessels are patent and normal in caliber. LUNG BASES: There are no focal consolidations or pleural effusions. HEPATOBILIARY: The liver is normal in size. There is focal fatty infiltration at the falciform ligament. Tiny hypodensity noted in the right hepatic dome, which is too small to characterize. There is no biliary ductal dilatation. The gallbladder is unremarkable. SPLEEN: Unremarkable. PANCREAS: Unremarkable ADRENAL GLANDS: Unremarkable. KIDNEYS: Kidneys are normal in size and contour and demonstrate symmetric enhancement. There is no hydronephrosis or nephrolithiasis. ABDOMINAL NODES: No adenopathy is appreciated. PELVIC ORGANS: The urinary bladder is unremarkable. The uterus is unremarkable. There is an involuting right corpus luteal cyst.  There is a 1.8 cm complex cystic lesion complaining fat consistent with a dermoid cyst. PERITONEUM/MESENTERY/BOWEL: The stomach is unremarkable. There is no bowel obstruction. There is no bowel wall thickening. The appendix is normal. BONES/SOFT TISSUES: There is no acute osseous or soft tissue abnormality.      No CT evidence of acute intra-abdominal pathology. 1.8 cm right dermoid cyst.  Further evaluation with pelvic ultrasound and gynecologic consult is recommended. I requested inpatient care from the general surgeon on-call Dr. Ambrosio Owens who graciously accepted the patient to his service    1. Right upper quadrant abdominal pain          DISPOSITION/PLAN  PATIENT REFERRED TO:  No follow-up provider specified.   DISCHARGE MEDICATIONS:  New Prescriptions    No medications on file         MD Meghan Sylvester MD  09/30/21 9757

## 2021-10-01 ENCOUNTER — ANESTHESIA EVENT (OUTPATIENT)
Dept: OPERATING ROOM | Age: 36
End: 2021-10-01
Payer: COMMERCIAL

## 2021-10-01 ENCOUNTER — ANESTHESIA (OUTPATIENT)
Dept: OPERATING ROOM | Age: 36
End: 2021-10-01
Payer: COMMERCIAL

## 2021-10-01 ENCOUNTER — APPOINTMENT (OUTPATIENT)
Dept: GENERAL RADIOLOGY | Age: 36
End: 2021-10-01
Payer: COMMERCIAL

## 2021-10-01 VITALS
OXYGEN SATURATION: 97 % | WEIGHT: 147.49 LBS | HEART RATE: 102 BPM | SYSTOLIC BLOOD PRESSURE: 129 MMHG | TEMPERATURE: 98.6 F | HEIGHT: 67 IN | DIASTOLIC BLOOD PRESSURE: 82 MMHG | RESPIRATION RATE: 18 BRPM | BODY MASS INDEX: 23.15 KG/M2

## 2021-10-01 VITALS
SYSTOLIC BLOOD PRESSURE: 106 MMHG | OXYGEN SATURATION: 99 % | RESPIRATION RATE: 2 BRPM | TEMPERATURE: 97.5 F | DIASTOLIC BLOOD PRESSURE: 55 MMHG

## 2021-10-01 PROCEDURE — APPNB180 APP NON BILLABLE TIME > 60 MINS: Performed by: NURSE PRACTITIONER

## 2021-10-01 PROCEDURE — 2500000003 HC RX 250 WO HCPCS: Performed by: NURSE ANESTHETIST, CERTIFIED REGISTERED

## 2021-10-01 PROCEDURE — 6370000000 HC RX 637 (ALT 250 FOR IP): Performed by: ANESTHESIOLOGY

## 2021-10-01 PROCEDURE — 7100000000 HC PACU RECOVERY - FIRST 15 MIN: Performed by: SURGERY

## 2021-10-01 PROCEDURE — 47563 LAPARO CHOLECYSTECTOMY/GRAPH: CPT | Performed by: SURGERY

## 2021-10-01 PROCEDURE — 6360000002 HC RX W HCPCS: Performed by: ANESTHESIOLOGY

## 2021-10-01 PROCEDURE — APPSS180 APP SPLIT SHARED TIME > 60 MINUTES: Performed by: NURSE PRACTITIONER

## 2021-10-01 PROCEDURE — G0378 HOSPITAL OBSERVATION PER HR: HCPCS

## 2021-10-01 PROCEDURE — 6360000002 HC RX W HCPCS: Performed by: SURGERY

## 2021-10-01 PROCEDURE — 6360000002 HC RX W HCPCS: Performed by: NURSE PRACTITIONER

## 2021-10-01 PROCEDURE — 99024 POSTOP FOLLOW-UP VISIT: CPT | Performed by: NURSE PRACTITIONER

## 2021-10-01 PROCEDURE — 88304 TISSUE EXAM BY PATHOLOGIST: CPT

## 2021-10-01 PROCEDURE — 74300 X-RAY BILE DUCTS/PANCREAS: CPT

## 2021-10-01 PROCEDURE — 2500000003 HC RX 250 WO HCPCS: Performed by: SURGERY

## 2021-10-01 PROCEDURE — 3700000001 HC ADD 15 MINUTES (ANESTHESIA): Performed by: SURGERY

## 2021-10-01 PROCEDURE — 3600000004 HC SURGERY LEVEL 4 BASE: Performed by: SURGERY

## 2021-10-01 PROCEDURE — 6360000004 HC RX CONTRAST MEDICATION: Performed by: SURGERY

## 2021-10-01 PROCEDURE — 2580000003 HC RX 258: Performed by: NURSE ANESTHETIST, CERTIFIED REGISTERED

## 2021-10-01 PROCEDURE — 7100000001 HC PACU RECOVERY - ADDTL 15 MIN: Performed by: SURGERY

## 2021-10-01 PROCEDURE — 2580000003 HC RX 258: Performed by: SURGERY

## 2021-10-01 PROCEDURE — 2709999900 HC NON-CHARGEABLE SUPPLY: Performed by: SURGERY

## 2021-10-01 PROCEDURE — 3700000000 HC ANESTHESIA ATTENDED CARE: Performed by: SURGERY

## 2021-10-01 PROCEDURE — 94760 N-INVAS EAR/PLS OXIMETRY 1: CPT

## 2021-10-01 PROCEDURE — APPNB45 APP NON BILLABLE 31-45 MINUTES: Performed by: NURSE PRACTITIONER

## 2021-10-01 PROCEDURE — 99219 PR INITIAL OBSERVATION CARE/DAY 50 MINUTES: CPT | Performed by: SURGERY

## 2021-10-01 PROCEDURE — 3600000014 HC SURGERY LEVEL 4 ADDTL 15MIN: Performed by: SURGERY

## 2021-10-01 PROCEDURE — 6370000000 HC RX 637 (ALT 250 FOR IP): Performed by: SURGERY

## 2021-10-01 PROCEDURE — 6360000002 HC RX W HCPCS: Performed by: NURSE ANESTHETIST, CERTIFIED REGISTERED

## 2021-10-01 RX ORDER — FENTANYL CITRATE 50 UG/ML
25 INJECTION, SOLUTION INTRAMUSCULAR; INTRAVENOUS EVERY 5 MIN PRN
Status: DISCONTINUED | OUTPATIENT
Start: 2021-10-01 | End: 2021-10-01 | Stop reason: HOSPADM

## 2021-10-01 RX ORDER — ONDANSETRON 2 MG/ML
INJECTION INTRAMUSCULAR; INTRAVENOUS PRN
Status: DISCONTINUED | OUTPATIENT
Start: 2021-10-01 | End: 2021-10-01 | Stop reason: SDUPTHER

## 2021-10-01 RX ORDER — PROMETHAZINE HYDROCHLORIDE 25 MG/ML
6.25 INJECTION, SOLUTION INTRAMUSCULAR; INTRAVENOUS
Status: DISCONTINUED | OUTPATIENT
Start: 2021-10-01 | End: 2021-10-01 | Stop reason: HOSPADM

## 2021-10-01 RX ORDER — MEPERIDINE HYDROCHLORIDE 25 MG/ML
12.5 INJECTION INTRAMUSCULAR; INTRAVENOUS; SUBCUTANEOUS EVERY 5 MIN PRN
Status: DISCONTINUED | OUTPATIENT
Start: 2021-10-01 | End: 2021-10-01 | Stop reason: HOSPADM

## 2021-10-01 RX ORDER — METHOCARBAMOL 100 MG/ML
INJECTION, SOLUTION INTRAMUSCULAR; INTRAVENOUS PRN
Status: DISCONTINUED | OUTPATIENT
Start: 2021-10-01 | End: 2021-10-01 | Stop reason: SDUPTHER

## 2021-10-01 RX ORDER — KETOROLAC TROMETHAMINE 15 MG/ML
15 INJECTION, SOLUTION INTRAMUSCULAR; INTRAVENOUS ONCE
Status: COMPLETED | OUTPATIENT
Start: 2021-10-01 | End: 2021-10-01

## 2021-10-01 RX ORDER — ROCURONIUM BROMIDE 10 MG/ML
INJECTION, SOLUTION INTRAVENOUS PRN
Status: DISCONTINUED | OUTPATIENT
Start: 2021-10-01 | End: 2021-10-01 | Stop reason: SDUPTHER

## 2021-10-01 RX ORDER — PROPOFOL 10 MG/ML
INJECTION, EMULSION INTRAVENOUS PRN
Status: DISCONTINUED | OUTPATIENT
Start: 2021-10-01 | End: 2021-10-01 | Stop reason: SDUPTHER

## 2021-10-01 RX ORDER — OXYCODONE HYDROCHLORIDE 5 MG/1
5 TABLET ORAL EVERY 6 HOURS PRN
Qty: 20 TABLET | Refills: 0 | Status: SHIPPED | OUTPATIENT
Start: 2021-10-01 | End: 2021-10-06

## 2021-10-01 RX ORDER — LABETALOL HYDROCHLORIDE 5 MG/ML
5 INJECTION, SOLUTION INTRAVENOUS EVERY 10 MIN PRN
Status: DISCONTINUED | OUTPATIENT
Start: 2021-10-01 | End: 2021-10-01 | Stop reason: HOSPADM

## 2021-10-01 RX ORDER — LIDOCAINE HYDROCHLORIDE 20 MG/ML
INJECTION, SOLUTION EPIDURAL; INFILTRATION; INTRACAUDAL; PERINEURAL PRN
Status: DISCONTINUED | OUTPATIENT
Start: 2021-10-01 | End: 2021-10-01 | Stop reason: SDUPTHER

## 2021-10-01 RX ORDER — DEXMEDETOMIDINE HYDROCHLORIDE 100 UG/ML
INJECTION, SOLUTION INTRAVENOUS PRN
Status: DISCONTINUED | OUTPATIENT
Start: 2021-10-01 | End: 2021-10-01 | Stop reason: SDUPTHER

## 2021-10-01 RX ORDER — FENTANYL CITRATE 50 UG/ML
50 INJECTION, SOLUTION INTRAMUSCULAR; INTRAVENOUS EVERY 5 MIN PRN
Status: DISCONTINUED | OUTPATIENT
Start: 2021-10-01 | End: 2021-10-01 | Stop reason: HOSPADM

## 2021-10-01 RX ORDER — BUPIVACAINE HYDROCHLORIDE 5 MG/ML
INJECTION, SOLUTION EPIDURAL; INTRACAUDAL
Status: COMPLETED | OUTPATIENT
Start: 2021-10-01 | End: 2021-10-01

## 2021-10-01 RX ORDER — KETAMINE HCL IN NACL, ISO-OSM 100MG/10ML
SYRINGE (ML) INJECTION PRN
Status: DISCONTINUED | OUTPATIENT
Start: 2021-10-01 | End: 2021-10-01 | Stop reason: SDUPTHER

## 2021-10-01 RX ORDER — DEXAMETHASONE SODIUM PHOSPHATE 4 MG/ML
INJECTION, SOLUTION INTRA-ARTICULAR; INTRALESIONAL; INTRAMUSCULAR; INTRAVENOUS; SOFT TISSUE PRN
Status: DISCONTINUED | OUTPATIENT
Start: 2021-10-01 | End: 2021-10-01 | Stop reason: SDUPTHER

## 2021-10-01 RX ORDER — ONDANSETRON 2 MG/ML
4 INJECTION INTRAMUSCULAR; INTRAVENOUS
Status: COMPLETED | OUTPATIENT
Start: 2021-10-01 | End: 2021-10-01

## 2021-10-01 RX ORDER — SCOLOPAMINE TRANSDERMAL SYSTEM 1 MG/1
1 PATCH, EXTENDED RELEASE TRANSDERMAL
Status: DISCONTINUED | OUTPATIENT
Start: 2021-10-01 | End: 2021-10-01 | Stop reason: HOSPADM

## 2021-10-01 RX ORDER — DIPHENHYDRAMINE HYDROCHLORIDE 50 MG/ML
12.5 INJECTION INTRAMUSCULAR; INTRAVENOUS
Status: DISCONTINUED | OUTPATIENT
Start: 2021-10-01 | End: 2021-10-01 | Stop reason: HOSPADM

## 2021-10-01 RX ORDER — FENTANYL CITRATE 50 UG/ML
INJECTION, SOLUTION INTRAMUSCULAR; INTRAVENOUS PRN
Status: DISCONTINUED | OUTPATIENT
Start: 2021-10-01 | End: 2021-10-01 | Stop reason: SDUPTHER

## 2021-10-01 RX ORDER — OXYCODONE HYDROCHLORIDE AND ACETAMINOPHEN 5; 325 MG/1; MG/1
1 TABLET ORAL
Status: COMPLETED | OUTPATIENT
Start: 2021-10-01 | End: 2021-10-01

## 2021-10-01 RX ORDER — MIDAZOLAM HYDROCHLORIDE 1 MG/ML
INJECTION INTRAMUSCULAR; INTRAVENOUS PRN
Status: DISCONTINUED | OUTPATIENT
Start: 2021-10-01 | End: 2021-10-01 | Stop reason: SDUPTHER

## 2021-10-01 RX ORDER — MAGNESIUM HYDROXIDE 1200 MG/15ML
LIQUID ORAL CONTINUOUS PRN
Status: COMPLETED | OUTPATIENT
Start: 2021-10-01 | End: 2021-10-01

## 2021-10-01 RX ADMIN — HYDROMORPHONE HYDROCHLORIDE 0.5 MG: 1 INJECTION, SOLUTION INTRAMUSCULAR; INTRAVENOUS; SUBCUTANEOUS at 11:40

## 2021-10-01 RX ADMIN — ONDANSETRON 4 MG: 2 INJECTION INTRAMUSCULAR; INTRAVENOUS at 11:33

## 2021-10-01 RX ADMIN — LIDOCAINE HYDROCHLORIDE 70 MG: 20 INJECTION, SOLUTION EPIDURAL; INFILTRATION; INTRACAUDAL; PERINEURAL at 10:27

## 2021-10-01 RX ADMIN — SODIUM CHLORIDE: 9 INJECTION, SOLUTION INTRAVENOUS at 17:51

## 2021-10-01 RX ADMIN — HYDROMORPHONE HYDROCHLORIDE 1 MG: 1 INJECTION, SOLUTION INTRAMUSCULAR; INTRAVENOUS; SUBCUTANEOUS at 17:49

## 2021-10-01 RX ADMIN — FENTANYL CITRATE 50 MCG: 0.05 INJECTION, SOLUTION INTRAMUSCULAR; INTRAVENOUS at 11:54

## 2021-10-01 RX ADMIN — MORPHINE SULFATE 2 MG: 2 INJECTION, SOLUTION INTRAMUSCULAR; INTRAVENOUS at 06:09

## 2021-10-01 RX ADMIN — OXYCODONE HYDROCHLORIDE AND ACETAMINOPHEN 1 TABLET: 5; 325 TABLET ORAL at 12:42

## 2021-10-01 RX ADMIN — FENTANYL CITRATE 50 MCG: 0.05 INJECTION, SOLUTION INTRAMUSCULAR; INTRAVENOUS at 12:00

## 2021-10-01 RX ADMIN — Medication 20 MG: at 10:28

## 2021-10-01 RX ADMIN — OXYCODONE HYDROCHLORIDE 10 MG: 10 TABLET ORAL at 16:41

## 2021-10-01 RX ADMIN — MORPHINE SULFATE 2 MG: 2 INJECTION, SOLUTION INTRAMUSCULAR; INTRAVENOUS at 13:51

## 2021-10-01 RX ADMIN — FENTANYL CITRATE 50 MCG: 50 INJECTION INTRAMUSCULAR; INTRAVENOUS at 10:22

## 2021-10-01 RX ADMIN — SODIUM CHLORIDE: 9 INJECTION, SOLUTION INTRAVENOUS at 06:11

## 2021-10-01 RX ADMIN — SUGAMMADEX 200 MG: 100 INJECTION, SOLUTION INTRAVENOUS at 11:04

## 2021-10-01 RX ADMIN — PROPOFOL 50 MG: 10 INJECTION, EMULSION INTRAVENOUS at 10:32

## 2021-10-01 RX ADMIN — ROCURONIUM BROMIDE 50 MG: 10 INJECTION INTRAVENOUS at 10:27

## 2021-10-01 RX ADMIN — METHOCARBAMOL 100 MG: 100 INJECTION INTRAMUSCULAR; INTRAVENOUS at 10:48

## 2021-10-01 RX ADMIN — PHENYLEPHRINE HYDROCHLORIDE 100 MCG: 10 INJECTION INTRAVENOUS at 11:01

## 2021-10-01 RX ADMIN — ONDANSETRON 4 MG: 2 INJECTION INTRAMUSCULAR; INTRAVENOUS at 02:44

## 2021-10-01 RX ADMIN — Medication 10 MG: at 10:31

## 2021-10-01 RX ADMIN — PROPOFOL 50 MG: 10 INJECTION, EMULSION INTRAVENOUS at 10:29

## 2021-10-01 RX ADMIN — OXYCODONE HYDROCHLORIDE 10 MG: 10 TABLET ORAL at 02:44

## 2021-10-01 RX ADMIN — CIPROFLOXACIN 400 MG: 2 INJECTION, SOLUTION INTRAVENOUS at 08:13

## 2021-10-01 RX ADMIN — ONDANSETRON 4 MG: 2 INJECTION INTRAMUSCULAR; INTRAVENOUS at 10:40

## 2021-10-01 RX ADMIN — HYDROMORPHONE HYDROCHLORIDE 0.5 MG: 1 INJECTION, SOLUTION INTRAMUSCULAR; INTRAVENOUS; SUBCUTANEOUS at 11:26

## 2021-10-01 RX ADMIN — METHOCARBAMOL 500 MG: 100 INJECTION INTRAMUSCULAR; INTRAVENOUS at 10:39

## 2021-10-01 RX ADMIN — PHENYLEPHRINE HYDROCHLORIDE 200 MCG: 10 INJECTION INTRAVENOUS at 10:38

## 2021-10-01 RX ADMIN — FENTANYL CITRATE 50 MCG: 50 INJECTION INTRAMUSCULAR; INTRAVENOUS at 11:10

## 2021-10-01 RX ADMIN — METHOCARBAMOL 100 MG: 100 INJECTION INTRAMUSCULAR; INTRAVENOUS at 10:54

## 2021-10-01 RX ADMIN — CIPROFLOXACIN 400 MG: 2 INJECTION, SOLUTION INTRAVENOUS at 10:34

## 2021-10-01 RX ADMIN — METHOCARBAMOL 100 MG: 100 INJECTION INTRAMUSCULAR; INTRAVENOUS at 10:42

## 2021-10-01 RX ADMIN — PROPOFOL 200 MG: 10 INJECTION, EMULSION INTRAVENOUS at 10:27

## 2021-10-01 RX ADMIN — KETOROLAC TROMETHAMINE 15 MG: 15 INJECTION, SOLUTION INTRAMUSCULAR; INTRAVENOUS at 16:27

## 2021-10-01 RX ADMIN — HYDROMORPHONE HYDROCHLORIDE 0.5 MG: 1 INJECTION, SOLUTION INTRAMUSCULAR; INTRAVENOUS; SUBCUTANEOUS at 11:31

## 2021-10-01 RX ADMIN — MIDAZOLAM 2 MG: 1 INJECTION INTRAMUSCULAR; INTRAVENOUS at 10:19

## 2021-10-01 RX ADMIN — PHENYLEPHRINE HYDROCHLORIDE 100 MCG: 10 INJECTION INTRAVENOUS at 10:58

## 2021-10-01 RX ADMIN — DEXMEDETOMIDINE HYDROCHLORIDE 4 MCG: 100 INJECTION, SOLUTION INTRAVENOUS at 10:50

## 2021-10-01 RX ADMIN — DEXAMETHASONE SODIUM PHOSPHATE 10 MG: 4 INJECTION, SOLUTION INTRAMUSCULAR; INTRAVENOUS at 10:40

## 2021-10-01 RX ADMIN — PHENYLEPHRINE HYDROCHLORIDE 100 MCG: 10 INJECTION INTRAVENOUS at 10:40

## 2021-10-01 RX ADMIN — DEXMEDETOMIDINE HYDROCHLORIDE 4 MCG: 100 INJECTION, SOLUTION INTRAVENOUS at 10:56

## 2021-10-01 RX ADMIN — DEXMEDETOMIDINE HYDROCHLORIDE 4 MCG: 100 INJECTION, SOLUTION INTRAVENOUS at 10:43

## 2021-10-01 RX ADMIN — METHOCARBAMOL 200 MG: 100 INJECTION INTRAMUSCULAR; INTRAVENOUS at 10:56

## 2021-10-01 RX ADMIN — HYDROMORPHONE HYDROCHLORIDE 1 MG: 1 INJECTION, SOLUTION INTRAMUSCULAR; INTRAVENOUS; SUBCUTANEOUS at 14:48

## 2021-10-01 RX ADMIN — OXYCODONE HYDROCHLORIDE 10 MG: 10 TABLET ORAL at 08:13

## 2021-10-01 ASSESSMENT — PAIN DESCRIPTION - PROGRESSION
CLINICAL_PROGRESSION: GRADUALLY WORSENING
CLINICAL_PROGRESSION: GRADUALLY WORSENING
CLINICAL_PROGRESSION: NOT CHANGED
CLINICAL_PROGRESSION: GRADUALLY IMPROVING
CLINICAL_PROGRESSION: NOT CHANGED
CLINICAL_PROGRESSION: NOT CHANGED

## 2021-10-01 ASSESSMENT — PULMONARY FUNCTION TESTS
PIF_VALUE: 4
PIF_VALUE: 22
PIF_VALUE: 0
PIF_VALUE: 10
PIF_VALUE: 17
PIF_VALUE: 1
PIF_VALUE: 2
PIF_VALUE: 10
PIF_VALUE: 15
PIF_VALUE: 10
PIF_VALUE: 10
PIF_VALUE: 0
PIF_VALUE: 10
PIF_VALUE: 17
PIF_VALUE: 15
PIF_VALUE: 15
PIF_VALUE: 2
PIF_VALUE: 3
PIF_VALUE: 0
PIF_VALUE: 16
PIF_VALUE: 14
PIF_VALUE: 3
PIF_VALUE: 17
PIF_VALUE: 0
PIF_VALUE: 18
PIF_VALUE: 18
PIF_VALUE: 17
PIF_VALUE: 15
PIF_VALUE: 0
PIF_VALUE: 14
PIF_VALUE: 10
PIF_VALUE: 16
PIF_VALUE: 16
PIF_VALUE: 18
PIF_VALUE: 18
PIF_VALUE: 16
PIF_VALUE: 16
PIF_VALUE: 15
PIF_VALUE: 15
PIF_VALUE: 1
PIF_VALUE: 13
PIF_VALUE: 1
PIF_VALUE: 1
PIF_VALUE: 10
PIF_VALUE: 1
PIF_VALUE: 17
PIF_VALUE: 0
PIF_VALUE: 16
PIF_VALUE: 13
PIF_VALUE: 1
PIF_VALUE: 0
PIF_VALUE: 15
PIF_VALUE: 1
PIF_VALUE: 1
PIF_VALUE: 15

## 2021-10-01 ASSESSMENT — PAIN SCALES - GENERAL
PAINLEVEL_OUTOF10: 8
PAINLEVEL_OUTOF10: 10
PAINLEVEL_OUTOF10: 9
PAINLEVEL_OUTOF10: 9
PAINLEVEL_OUTOF10: 0
PAINLEVEL_OUTOF10: 7
PAINLEVEL_OUTOF10: 8
PAINLEVEL_OUTOF10: 8
PAINLEVEL_OUTOF10: 0
PAINLEVEL_OUTOF10: 6
PAINLEVEL_OUTOF10: 10
PAINLEVEL_OUTOF10: 8
PAINLEVEL_OUTOF10: 9
PAINLEVEL_OUTOF10: 8
PAINLEVEL_OUTOF10: 9
PAINLEVEL_OUTOF10: 0
PAINLEVEL_OUTOF10: 9
PAINLEVEL_OUTOF10: 0
PAINLEVEL_OUTOF10: 4
PAINLEVEL_OUTOF10: 9
PAINLEVEL_OUTOF10: 4
PAINLEVEL_OUTOF10: 10

## 2021-10-01 ASSESSMENT — PAIN DESCRIPTION - LOCATION
LOCATION: ABDOMEN

## 2021-10-01 ASSESSMENT — PAIN DESCRIPTION - FREQUENCY
FREQUENCY: CONTINUOUS

## 2021-10-01 ASSESSMENT — PAIN DESCRIPTION - ONSET
ONSET: ON-GOING

## 2021-10-01 ASSESSMENT — PAIN DESCRIPTION - PAIN TYPE
TYPE: SURGICAL PAIN

## 2021-10-01 ASSESSMENT — PAIN - FUNCTIONAL ASSESSMENT
PAIN_FUNCTIONAL_ASSESSMENT: PREVENTS OR INTERFERES SOME ACTIVE ACTIVITIES AND ADLS
PAIN_FUNCTIONAL_ASSESSMENT: PREVENTS OR INTERFERES SOME ACTIVE ACTIVITIES AND ADLS
PAIN_FUNCTIONAL_ASSESSMENT: 0-10
PAIN_FUNCTIONAL_ASSESSMENT: ACTIVITIES ARE NOT PREVENTED
PAIN_FUNCTIONAL_ASSESSMENT: PREVENTS OR INTERFERES SOME ACTIVE ACTIVITIES AND ADLS
PAIN_FUNCTIONAL_ASSESSMENT: ACTIVITIES ARE NOT PREVENTED
PAIN_FUNCTIONAL_ASSESSMENT: PREVENTS OR INTERFERES SOME ACTIVE ACTIVITIES AND ADLS

## 2021-10-01 ASSESSMENT — PAIN DESCRIPTION - ORIENTATION
ORIENTATION: MID

## 2021-10-01 ASSESSMENT — PAIN DESCRIPTION - DESCRIPTORS
DESCRIPTORS: ACHING
DESCRIPTORS: DISCOMFORT
DESCRIPTORS: ACHING
DESCRIPTORS: ACHING;STABBING
DESCRIPTORS: ACHING
DESCRIPTORS: ACHING

## 2021-10-01 ASSESSMENT — LIFESTYLE VARIABLES: SMOKING_STATUS: 0

## 2021-10-01 NOTE — OP NOTE
Laparoscopic Cholecystectomy Operative Report      Patient: Philipp Beach MRN: [id number]     YOB: 1985  Age: 31 y.o. Sex: female        Primary Care Physician: JOSHUA Santos CNP         DATE OF OPERATION: 10/1/2021     PREOPERATIVE DIAGNOSIS: cholecystitis    POSTOPERATIVE DIAGNOSIS: cholecystitis    PROCEDURE PERFORMED: Laparoscopic cholecystectomy with cholangiogram    SURGEON: Dannie Kunz MD, MD    ANESTHESIA: GETA with local anesthetic    ASA CLASS: 2    DVT PROPHYLAXIS: BLE pneumatic compression devices    ANTIBIOTICS: cipro IV    INDICATIONS: Symptomatic gallbladder disease. The patient has an ultrasound that does show sludge. The patient's symptoms were felt to be secondary to gallbladder disease and they presented as inpatient for removal after the risks, benefits and alternatives were discussed with them. Procedure Details:       After informed consent was obtained, the patient was brought to the operating room and placed on the table in the supine position. Preoperative antibiotics were given. Once under general endotracheal anesthesia, the abdomen was prepped and sterilely draped in the standard fashion. A time-out was performed for patient and procedure verification. A small periumbilical incision was made just at the umbilicus and a Veress needle inserted into peritoneal cavity. The abdomen was insufflated with carbon dioxide to a pressure of 15 mmHg. A 5mm trocar was introduced and a camera placed. Under direct vision, a 10mm port was placed in the subxiphoid location and two 5 mm ports placed in the right upper quadrant. The dome of the gallbladder was grasped and elevated up and over the liver. The patient had little inflammation and adhesions. We bluntly took down the peritoneum over the infundibulum of the gallbladder. The infundibulum was then grasped and retracted laterally exposing the triangle of Calot.   We performed our dissection until the anatomy was very clear as we could see two and only two structures entering the gallbladder and completing our critical view of safety. The cystic duct was dissected toward the anu exposing the common bile duct and common hepatic duct. A clip was placed on the gallbladder side of the cystic duct. A small stab incision was made just below the costal margin in the RUQ and the cholangiogram catheter was introduced. A ductotomy was made in the cystic duct and the catheter inserted. Cholangiogram showed contrast flow into the duodenum. Common bile duct, common hepatic duct, cystic duct and junction of the left and right hepatic ducts were well visualized. No filling defects were noted. The catheter was removed. The cystic duct was further cleared circumferentially was clipped proximally with 3 clips and divided. The artery was likewise identified, clipped and divided. The gallbladder was removed from the liver bed with cautery and removed through the epigastric port site. The abdomen was reinspected and found to have good hemostasis. The epigastric trocar site fascia was closed with a single suture of 0-Vicryl using a laparoscopic suture passer. The trocars were withdrawn and the skin closed with 4-0 Vicryl. Skin affix dermal adhesive was applied after injecting local anesthetic Patient was awoken and extubated without complication. All instrument counts were said to be correct. Findings: normal IOC.   No inflammatory changes    Estimated Blood Loss: less than 50 ml    Complications: none           Specimen: gallbladder and contents                  Electronically signed by Renetta Valdez MD on 10/1/2021 at 11:02 AM

## 2021-10-01 NOTE — PROGRESS NOTES
Patient arrived unit from ED, patient alert and oriented VSS. Patient oriented to room, bed at lowest position and locked. Call light use explained to patient and within reach. Admission order released and explained to patient. Will continue to monitor.

## 2021-10-01 NOTE — PROGRESS NOTES
Nutrition Assessment     Type and Reason for Visit: Initial, Positive Nutrition Screen    Nutrition Recommendations/Plan:   Monitor or diet advancement post surgery  Monitor intake  Offer supplement if pt remains longer than obs and has poor intake     Nutrition Assessment:  Pt admitted for cholecystitis and at current time in surgery for lap becca. Pt was +nutrition screen for poor po and wt loss. Pt  reported not eating at all for 2 daays pta with vomiting a few times. Pt has been NPO since admission in prep for surgery. Will continue to monitor and add supplement if intake does not improve after surgery.     Malnutrition Assessment:  Malnutrition Status: No malnutrition    Nutrition Related Findings: no edema, no BM recorded,       Current Nutrition Therapies:    Diet NPO    Anthropometric Measures:  · Height: 5' 7\" (170.2 cm)  · Current Body Wt: 147 lb 7.8 oz (66.9 kg)   · BMI: 23.1    Nutrition Diagnosis:   · Inadequate oral intake related to inadequate protein-energy intake as evidenced by NPO or clear liquid status due to medical condition, poor intake prior to admission      Nutrition Interventions:   Food and/or Nutrient Delivery:  Continue NPO  Nutrition Education/Counseling:  No recommendation at this time   Coordination of Nutrition Care:  Continue to monitor while inpatient    Goals:  >50% of meals on most appropriate diet       Nutrition Monitoring and Evaluation:   Behavioral-Environmental Outcomes:  None Identified   Food/Nutrient Intake Outcomes:  Diet Advancement/Tolerance  Physical Signs/Symptoms Outcomes:  Biochemical Data, Nutrition Focused Physical Findings, GI Status, Nausea or Vomiting, Weight     Discharge Planning:    No discharge needs at this time     Electronically signed by Pablo Campa RD, LD on 10/1/21 at 11:02 AM EDT    Contact: [phone]

## 2021-10-01 NOTE — H&P
General Surgery History & Physical      Allison Freed   : 1985 MRN: [id number]  Date of Admission: 2021  Admitting Physician:Steve Amos MD  Primary Care Physician: Jermain Ramirez, APRN - CNP    Chief Complaint: \"abdominal pain\"    Diagnosis Present on Admission:   Cholecystitis      Secondary Diagnosis  Patient Active Problem List   Diagnosis    Myofascial pain    Otalgia    Nasal obstruction    Cholecystitis       History of Present Illness  Allison Freed is a 31 y.o. female admitted on 2021 for cholecystitis. Reports one week history postprandial RUQ pain, nausea, emesis. U/S with sludge. Being treated as outpt for H. Pylori. Scheduled for lap becca at OSH next Monday but pain worsened last evening prompting return to ED. Labs, LFTs ok. Prior to Admission medications    Medication Sig Start Date End Date Taking? Authorizing Provider   clarithromycin (BIAXIN) 500 MG tablet Take 500 mg by mouth 2 times daily 9/27/21 10/6/21 Yes Historical Provider, MD   omeprazole (PRILOSEC) 40 MG delayed release capsule Take 40 mg by mouth daily 21  Yes Historical Provider, MD   metroNIDAZOLE (FLAGYL) 500 MG tablet Take 500 mg by mouth 2 times daily 9/27/21 10/4/21 Yes Historical Provider, MD   ondansetron (ZOFRAN) 4 MG tablet Take 1 tablet by mouth every 8 hours as needed for Nausea 21  Yes Sonia Valencia MD   HYDROcodone-acetaminophen (NORCO) 5-325 MG per tablet Take 1 tablet by mouth every 4 hours as needed for Pain for up to 3 days. Intended supply: 3 days. Take lowest dose possible to manage pain 9/28/21 10/1/21 Yes Sonia Valencia MD     Past Medical History:   Diagnosis Date    Anxiety     Asthma      Past Surgical History:   Procedure Laterality Date    OVARY REMOVAL       History reviewed. No pertinent family history.   Social History     Socioeconomic History    Marital status:      Spouse name: Not on file    Number of children: Not on file    Years of education: Not on file    Highest education level: Not on file   Occupational History    Not on file   Tobacco Use    Smoking status: Former Smoker    Smokeless tobacco: Never Used    Tobacco comment: when pt was 20    Substance and Sexual Activity    Alcohol use: Yes     Comment: occasional    Drug use: No    Sexual activity: Not on file   Other Topics Concern    Not on file   Social History Narrative    Not on file     Social Determinants of Health     Financial Resource Strain:     Difficulty of Paying Living Expenses:    Food Insecurity:     Worried About Running Out of Food in the Last Year:     [address] in the Last Year:    Transportation Needs:     Lack of Transportation (Medical):  Lack of Transportation (Non-Medical):    Physical Activity:     Days of Exercise per Week:     Minutes of Exercise per Session:    Stress:     Feeling of Stress :    Social Connections:     Frequency of Communication with Friends and Family:     Frequency of Social Gatherings with Friends and Family:     Attends Jain Services:     Active Member of Clubs or Organizations:     Attends Club or Organization Meetings:     Marital Status:    Intimate Partner Violence:     Fear of Current or Ex-Partner:     Emotionally Abused:     Physically Abused:     Sexually Abused:       Allergies   Allergen Reactions    Ceclor [Cefaclor] Anaphylaxis    Cephalosporins Anaphylaxis    Codeine Nausea Only         Review of Systems  12 point review of systems was reviewed and negative except for that listed in HPI    Physical Exam  Vitals:    09/30/21 1840 09/30/21 2027 10/01/21 0350 10/01/21 1003   BP:  119/69 (!) 100/57 127/73   Pulse: 74 78 76 91   Resp: 11 16 15 15   Temp:  98.6 °F (37 °C) 98.4 °F (36.9 °C) 97 °F (36.1 °C)   TempSrc:  Oral Oral Temporal   SpO2: 98% 98% 98% 100%   Weight:  147 lb 7.8 oz (66.9 kg)     Height:  5' 7\" (1.702 m)         General/Appearance: NAD, alert oriented x3  HEENT: NCAT, PERRLA, Conjunctiva non injected, no scleral icterus. External ears and nares normal bilaterally. Mucous membranes pink and moist.   Neck: Neck is symmetrical. Trachea appears midline. Lung: clear to auscultation bilaterally, normal rate and effort  Cardiac: regular rate and rhythm  Abdomen: soft, ND.  TTP RUQ  Neuro: No gross motor or sensory deficits. Skin: No open wounds or rashes. MSK: normal ROM, no edema  Psych: normal insight, judgment    Labs  Recent Labs     09/30/21  1328   WBC 7.6   HGB 13.9   HCT 41.4        Recent Labs     09/30/21  1329      K 4.2      CO2 21   BUN 10   GFRAA >60     Recent Labs     09/30/21  1329   AST 14*   ALT 14     Invalid input(s): [facility], [facility], [facility], [place], [facility], [place], [place], [place], [place], EOS    Imaging  US NON OB TRANSVAGINAL   Final Result   Dermoid cyst within the right ovary, consistent with the findings of   09/30/2021. Normal arterial and venous blood flow is identified within the   right ovary. No sonographic evidence of torsion. Please see below for   specific recommendations involving the dermoid cyst.      Small uterine fibroid measuring up to 2.7 cm. Normal sonographic appearance of the left ovary, uterus, and endometrium for   patient age. RECOMMENDATIONS:   Multiple ovarian cysts. Most severe: 2 cm dermoid ovarian cyst.  Recommend   pelvic MRI w/IV contrast. If not surgically resected, pelvic US follow-up   annually. Reference: Radiology 2010 Sep;256(3):943-54         US PELVIS COMPLETE   Final Result   Dermoid cyst on the right measuring 1.9 cm maximally as noted on the earlier   CT. Surgical consultation recommended. Continued surveillance is   recommended annually if not surgically resected. Posterior fundal fibroid measuring 2.7 cm. Thickened endometrial stripe   possibly related to the stage of menstruation. Reportedly previous left oophorectomy with possible remnant tissue on the   left.   Physiologic free fluid in the cul-de-sac. RECOMMENDATIONS:   Multiple ovarian cysts. Most severe: 1.9 cm dermoid ovarian cyst.  Recommend   pelvic MRI w/IV contrast. If not surgically resected, pelvic US follow-up   annually. Reference: Radiology 2010 Sep;256(3):666-42         CT ABDOMEN PELVIS W IV CONTRAST Additional Contrast? None   Final Result   No CT evidence of acute intra-abdominal pathology. 1.8 cm right dermoid cyst.  Further evaluation with pelvic ultrasound and   gynecologic consult is recommended. Sujatha Scott is a 31 y.o. female admitted for cholecystitis    Plan    1. Cholecystitis  · IV abx  · OR for Laparoscopic cholecystectomy with cholangiogram, possible open  · The risks, benefits and alternatives to the planned procedure were discussed. Patient expressed an understanding and is willing to proceed. · Home later today  · Will continue abx, PPI as outpt for H. pylori  2.  Right ovarian cyst  · GYN F/U as outpatient      Electronically signed by Renetta Valdez MD on 10/1/2021 at 10:11 AM
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
## 2021-10-01 NOTE — ANESTHESIA PRE PROCEDURE
Department of Anesthesiology  Preprocedure Note       Name:  Philipp Beach   Age:  31 y.o.  :  1985                                          MRN:  [id number]         Date:  10/1/2021      Surgeon: Dilan Query):  Dannie Kunz MD    Procedure: Procedure(s):  LAPAROSCOPIC CHOLECYSTECTOMY WITH CHOLANGIOGRAM, POSSIBLE OPEN    Medications prior to admission:   Prior to Admission medications    Medication Sig Start Date End Date Taking? Authorizing Provider   clarithromycin (BIAXIN) 500 MG tablet Take 500 mg by mouth 2 times daily 9/27/21 10/6/21 Yes Historical Provider, MD   omeprazole (PRILOSEC) 40 MG delayed release capsule Take 40 mg by mouth daily 21  Yes Historical Provider, MD   metroNIDAZOLE (FLAGYL) 500 MG tablet Take 500 mg by mouth 2 times daily 9/27/21 10/4/21 Yes Historical Provider, MD   ondansetron (ZOFRAN) 4 MG tablet Take 1 tablet by mouth every 8 hours as needed for Nausea 21  Yes Jamie Llanes MD   HYDROcodone-acetaminophen (NORCO) 5-325 MG per tablet Take 1 tablet by mouth every 4 hours as needed for Pain for up to 3 days. Intended supply: 3 days.  Take lowest dose possible to manage pain 9/28/21 10/1/21 Yes Jamie Llanes MD       Current medications:    Current Facility-Administered Medications   Medication Dose Route Frequency Provider Last Rate Last Admin    sodium chloride flush 0.9 % injection 5-40 mL  5-40 mL IntraVENous 2 times per day Dannie Kunz MD   10 mL at 21    sodium chloride flush 0.9 % injection 5-40 mL  5-40 mL IntraVENous PRN Dannie Kunz MD        0.9 % sodium chloride infusion  25 mL IntraVENous PRN Dannie Kunz MD        ondansetron (ZOFRAN-ODT) disintegrating tablet 4 mg  4 mg Oral Q8H PRN Dannie Kunz MD        Or    [facility]ron [facility]) injection 4 mg  4 mg IntraVENous Q6H PRN Dannie Kunz MD   4 mg at 10/01/21 0244    enoxaparin (LOVENOX) injection 40 mg  40 mg SubCUTAneous Daily Dannie Kunz MD        0.9 % sodium chloride infusion   IntraVENous Continuous Kaushik Paulson  mL/hr at 10/01/21 0611 New Bag at 10/01/21 0611    ciprofloxacin (CIPRO) IVPB 400 mg  400 mg IntraVENous Q12H Kaushik Paulson  mL/hr at 10/01/21 0813 400 mg at 10/01/21 0813    acetaminophen (TYLENOL) tablet 650 mg  650 mg Oral Q6H PRN Kaushik Paulson MD        oxyCODONE (ROXICODONE) immediate release tablet 5 mg  5 mg Oral Q4H PRN Kaushik Paulson MD        Or   [address] [place] [address] oxyCODONE HCl (OXY-IR) immediate release tablet 10 mg  10 mg Oral Q4H PRN Kaushik Paulson MD   10 mg at 10/01/21 0813    morphine (PF) injection 2 mg  2 mg IntraVENous Q3H PRN Kaushik Paulson MD   2 mg at 10/01/21 0609       Allergies:     Allergies   Allergen Reactions    Ceclor [Cefaclor] Anaphylaxis    Cephalosporins Anaphylaxis    Codeine Nausea Only       Problem List:    Patient Active Problem List   Diagnosis Code    Myofascial pain M79.18    Otalgia H92.09    Nasal obstruction J34.89    Cholecystitis K81.9       Past Medical History:        Diagnosis Date    Anxiety     Asthma        Past Surgical History:        Procedure Laterality Date    OVARY REMOVAL         Social History:    Social History     Tobacco Use    Smoking status: Former Smoker    Smokeless tobacco: Never Used    Tobacco comment: when pt was 20    Substance Use Topics    Alcohol use: Yes     Comment: occasional                                Counseling given: Not Answered  Comment: when pt was 20       Vital Signs (Current):   Vitals:    09/30/21 1831 09/30/21 1840 09/30/21 2027 10/01/21 0350   BP: 113/61  119/69 (!) 100/57   Pulse: 103 74 78 76   Resp: 19 11 16 15   Temp: 98.7 °F (37.1 °C)  98.6 °F (37 °C) 98.4 °F (36.9 °C)   TempSrc: Oral  Oral Oral   SpO2: 98% 98% 98% 98%   Weight:   147 lb 7.8 oz (66.9 kg)    Height:   5' 7\" (1.702 m)                                               BP Readings from Last 3 Encounters:   10/01/21 (!) 100/57   09/28/21 106/71   08/27/18 110/68       NPO Status: BMI:   Wt Readings from Last 3 Encounters:   09/30/21 147 lb 7.8 oz (66.9 kg)   09/27/21 146 lb 13.2 oz (66.6 kg)   03/22/17 145 lb (65.8 kg)     Body mass index is 23.1 kg/m². CBC:   Lab Results   Component Value Date    WBC 7.6 09/30/2021    RBC 4.66 09/30/2021    HGB 13.9 09/30/2021    HCT 41.4 09/30/2021    MCV 88.8 09/30/2021    RDW 13.7 09/30/2021     09/30/2021       CMP:   Lab Results   Component Value Date     09/30/2021    K 4.2 09/30/2021     09/30/2021    CO2 21 09/30/2021    BUN 10 09/30/2021    CREATININE 0.8 09/30/2021    GFRAA >60 09/30/2021    GFRAA >60 01/09/2013    AGRATIO 1.6 09/27/2021    LABGLOM >60 09/30/2021    GLUCOSE 124 09/30/2021    PROT 8.1 09/30/2021    PROT 7.7 01/09/2013    CALCIUM 9.8 09/30/2021    BILITOT 0.6 09/30/2021    ALKPHOS 63 09/30/2021    AST 14 09/30/2021    ALT 14 09/30/2021       POC Tests: No results for input(s): POCGLU, POCNA, POCK, POCCL, POCBUN, POCHEMO, POCHCT in the last 72 hours.     Coags:   Lab Results   Component Value Date    PROTIME 12.2 04/25/2012    INR 1.07 04/25/2012    APTT 31.3 04/25/2012       HCG (If Applicable):   Lab Results   Component Value Date    PREGTESTUR Negative 09/30/2021        ABGs: No results found for: PHART, PO2ART, BAK3UYH, YHX8CSZ, BEART, J0IQKDRF     Type & Screen (If Applicable):  No results found for: LABABO, LABRH    Drug/Infectious Status (If Applicable):  No results found for: HIV, HEPCAB    COVID-19 Screening (If Applicable):   Lab Results   Component Value Date    COVID19 Not Detected 09/30/2021           Anesthesia Evaluation  Patient summary reviewed no history of anesthetic complications:   Airway: Mallampati: I  TM distance: >3 FB   Neck ROM: full  Mouth opening: > = 3 FB Dental: normal exam         Pulmonary:   (+) asthma (As child): exercise-induced asthma,     (-) not a current smoker Cardiovascular:  Exercise tolerance: good (>4 METS),       (-) past MI, CABG/stent and  angina       Beta Blocker:  Not on Beta Blocker         Neuro/Psych:      (-) seizures and CVA           GI/Hepatic/Renal:   (+) GERD:,      (-) liver disease and no renal disease       Endo/Other:        (-) diabetes mellitus               Abdominal:             Vascular: Other Findings:             Anesthesia Plan      general     ASA 2       Induction: intravenous. MIPS: Postoperative opioids intended and Prophylactic antiemetics administered. Anesthetic plan and risks discussed with patient. Plan discussed with CRNA. This pre-anesthesia assessment may be used as a history and physical.    DOS STAFF ADDENDUM:    Pt seen and examined, chart reviewed (including anesthesia, drug and allergy history). No interval changes to history and physical examination. Anesthetic plan, risks, benefits, alternatives, and personnel involved discussed with patient. Patient verbalized an understanding and agrees to proceed.       Mali Jauregui MD  October 1, 2021  10:02 AM

## 2021-10-01 NOTE — PROGRESS NOTES
Verbal and written discharge instructions was given to the patient and her father. Patient verbalizes understanding of d/c instructions including medications orders for home and possible side effects associated with them. Pt. instructed and verbalizes understanding to call the doctor listed if they should have any complications or worsening of symptoms and verbalizes understanding about follow-up appointments. D/C'd IV access, patient tolerated well, no signs of bleeding. Pt. Discharged to home with all belongings. Out via wheelchair.

## 2021-10-01 NOTE — CARE COORDINATION
INITIAL CASE MANAGEMENT ASSESSMENT    Reviewed chart, met with patient to assess possible discharge needs. Explained Case Management role/services. Living Situation: Patients lives with her parents in a house with 3 steps to enter. ADLs: Independent     DME: None    PT/OT Recs: None     Active Services: None     Transportation: Active /Parents will transport     Medications: [facility] on [address]No barriers    PCP: JOSHUA Pederson CNP      HD/PD: N/A    PLAN/COMMENTS:   1. Denies needs. SW/CM provided contact information for patient or family to call with any questions. SW/CM will follow and assist as needed.   Markham Dance RN, BSN, Case Management  [phone]  Electronically signed by Elisabeth Castle RN on 10/1/2021 at 2:41 PM

## 2021-10-01 NOTE — ANESTHESIA POSTPROCEDURE EVALUATION
Department of Anesthesiology  Postprocedure Note    Patient: Nneka Urbina  MRN: [id number]  YOB: 1985  Date of evaluation: 10/1/2021  Time:  12:07 PM     Procedure Summary     Date: 10/01/21 Room / Location:  [facility] [address] [place] [address]    Anesthesia Start: 2816 Anesthesia Stop: 0939    Procedure: LAPAROSCOPIC CHOLECYSTECTOMY WITH CHOLANGIOGRAM, (N/A Abdomen) Diagnosis: (UNKNOWN)    Surgeons: Alejandra Shane MD Responsible Provider: Mali Jauregui MD    Anesthesia Type: general ASA Status: 2          Anesthesia Type: general    Bina Phase I: Bina Score: 5    Bina Phase II:      Last vitals: Reviewed and per EMR flowsheets.        Anesthesia Post Evaluation    Patient location during evaluation: bedside  Patient participation: complete - patient participated  Level of consciousness: awake and alert  Pain score: 3  Nausea & Vomiting: no nausea  Complications: no  Cardiovascular status: hemodynamically stable  Respiratory status: acceptable  Hydration status: stable

## 2021-10-01 NOTE — PROGRESS NOTES
4 Eyes Skin Assessment     NAME:  Rocky Mejía  YOB: 1985  MEDICAL RECORD NUMBER:  [id number]    The patient is being assess for  Admission    I agree that 2 RN's have performed a thorough Head to Toe Skin Assessment on the patient. ALL assessment sites listed below have been assessed. Areas assessed by both nurses:    Head, Face, Ears, Shoulders, Back, Chest, Arms, Elbows, Hands, Sacrum. Buttock, Coccyx, Ischium and Legs. Feet and Heels        Does the Patient have a Wound?  No noted wound(s)       Bhavin Prevention initiated:  NA   Wound Care Orders initiated:  NA    Pressure Injury (Stage 3,4, Unstageable, DTI, NWPT, and Complex wounds) if present place consult order under [de-identified] No    New and Established Ostomies if present place consult order under : No      Nurse 1 eSignature: Electronically signed by Lars Klein RN on 10/1/21 at 1:02 AM EDT    **SHARE this note so that the co-signing nurse is able to place an eSignature**    Nurse 2 eSignature: Electronically signed by Anitra Roldan RN on 10/1/21 at 3:17 AM EDT

## 2021-10-01 NOTE — PLAN OF CARE
Problem: Pain:  Description: Pain management should include both nonpharmacologic and pharmacologic interventions.   Goal: Pain level will decrease  Description: Pain level will decrease  10/1/2021 1133 by Priyank Dias RN  Outcome: Ongoing  9/30/2021 2348 by Madie Adams RN  Outcome: Ongoing  Goal: Control of acute pain  Description: Control of acute pain  10/1/2021 1133 by Priyank Dias RN  Outcome: Ongoing  9/30/2021 2348 by Madie Adams RN  Outcome: Ongoing  Goal: Control of chronic pain  Description: Control of chronic pain  10/1/2021 1133 by Priyank Dias RN  Outcome: Ongoing  9/30/2021 2348 by Madie Adams RN  Outcome: Ongoing  Goal: Patient's pain/discomfort is manageable  Description: Patient's pain/discomfort is manageable  10/1/2021 1133 by Priyank Dias RN  Outcome: Ongoing  9/30/2021 2348 by Madie Adams RN  Outcome: Ongoing     Problem: Infection:  Goal: Will remain free from infection  Description: Will remain free from infection  10/1/2021 1133 by Priyank Dias RN  Outcome: Ongoing  9/30/2021 2348 by Madie Adams RN  Outcome: Ongoing     Problem: Safety:  Goal: Free from accidental physical injury  Description: Free from accidental physical injury  10/1/2021 1133 by Priyank Dias RN  Outcome: Ongoing  9/30/2021 2348 by Madie Adams RN  Outcome: Ongoing  Goal: Free from intentional harm  Description: Free from intentional harm  10/1/2021 1133 by Priyank Dias RN  Outcome: Ongoing  9/30/2021 2348 by Madie Adams RN  Outcome: Ongoing     Problem: Daily Care:  Goal: Daily care needs are met  Description: Daily care needs are met  10/1/2021 1133 by Priyank Dias RN  Outcome: Ongoing  9/30/2021 2348 by Madie Adams RN  Outcome: Ongoing     Problem: Skin Integrity:  Goal: Skin integrity will stabilize  Description: Skin integrity will stabilize  10/1/2021 1133 by Priyank Dias RN  Outcome: Ongoing  9/30/2021 2348 by Oanh Philip, RN  Outcome: Ongoing     Problem: Discharge Planning:  Goal: Patients continuum of care needs are met  Description: Patients continuum of care needs are met  10/1/2021 1133 by Peter Vázquez RN  Outcome: Ongoing  9/30/2021 2348 by Oanh Philip, RAMEZ  Outcome: Ongoing

## 2021-10-05 ENCOUNTER — CARE COORDINATION (OUTPATIENT)
Dept: CASE MANAGEMENT | Age: 36
End: 2021-10-05

## 2021-10-05 NOTE — CARE COORDINATION
[facility] Transitions Initial Follow Up Call    Call within 2 business days of discharge: Yes    Patient: Giesl Bailon Patient : 1985   MRN: [id number]  Reason for Admission: Right upper quadrant Abdominal pain  Discharge Date: 10/1/21 RARS: No data recorded    Last Discharge [facility]       Complaint Diagnosis Description Type Department Provider    21 Abdominal Pain Right upper quadrant abdominal pain . .. ED to Hosp-Admission (Discharged) (ADMITTED) [facility] Allison Roach MD; Quynh Robles. .. [facility]    Non-face-to-face services provided:  Obtained and reviewed discharge summary and/or continuity of care documents     Transitions of Care Initial Call    Was this an external facility discharge? No     Challenges to be reviewed by the provider   Additional needs identified to be addressed with provider: No  none             Method of communication with provider : none    Was this a readmission? No  Patient stated reason for admission: Abdominal pain  Patients top risk factors for readmission: medical condition-.    Care Transition Nurse (CTN) contacted the patient by telephone to perform post hospital discharge assessment. Verified name and  with patient as identifiers. Provided introduction to self, and explanation of the CTN role. CTN reviewed discharge instructions, medical action plan and red flags with patient who verbalized understanding. Patient given an opportunity to ask questions and does not have any further questions or concerns at this time. Were discharge instructions available to patient? Yes. Reviewed appropriate site of care based on symptoms and resources available to patient including: PCP and Specialist. The patient agrees to contact the PCP office for questions related to their healthcare. Reviewed and educated patient on any new and changed medications related to discharge diagnosis.        CTN provided contact information. Plan for next call: symptom management-., self management-. and follow up appointment-.          Care Transitions 24 Hour Call    Do you have any ongoing symptoms?: Yes  Patient-reported symptoms: Pain, Nausea  Do you have a copy of your discharge instructions?: Yes  Do you have all of your prescriptions and are they filled?: Yes  Have you been contacted by a [employer] [facility] [place]?: No  Have you scheduled your follow up appointment?: Yes  How are you going to get to your appointment?: Car - family or friend to transport  Were you discharged with any Home Care or Post Acute Services: No  Do you feel like you have everything you need to keep you well at home?: Yes  Care Transitions Interventions         Follow Up  Future Appointments   Date Time Provider Leeann Tao   10/14/2021  1:30 PM Boy Amado MD [id number]       Pt reports doing well this morning. She states she went to her follow up appointment with Lamine Merrill NP who didn't change any of her medications since hospital discharge. Pt stated she was feeling nauseated and had a poor appetite at this time but was staying hydrated. Encouraged pt to reach out to PCP if she felt like she needed a prescription. Also encouraged a bland diet. Pt stated she didn't need anything at the moment. Pt stated she felt like her pain was being well managed with prescribed narcotic but pt stated it was a 7/10 at this time. CTN asked about laparoscopic incision sites and pt reported 5 sites with no redness, swelling or drainage at this time. Stated she was afebrile today. CTN went over site care and signs and symptoms of infection. Also went over AVS instructions. Pt verbalized understanding. Pt stated she would make a follow up appointment with general surgery for 2 week visit. Encouraged pt to reach out to PCP if she has further needs. No other needs from CTN at this time. Will resolve episode and remain available.      Dorcas Boyle, RICKEYN, RN Care Transition Nurse  Mobile: [phone]

## 2021-10-06 NOTE — DISCHARGE SUMMARY
Physician Discharge Summary     Patient ID:  Hubert Murdock  [id number]  56 y.o.  1985    Admit date: 9/30/2021    Discharge date and time: 10/1/2021  7:47 PM     Admitting Physician: Ruddy Burks MD     Discharge Physician: same    Admission Diagnoses: Cholecystitis [K81.9]  Right upper quadrant abdominal pain [R10.11]    Discharge Diagnoses: same    Admission Condition: fair    Discharged Condition: good    Indication for Admission: Abdominal pain    Hospital Course:   Cholecystitis  Laparoscopic cholecystectomy 10/1/2021  Stable postoperative course, discharged home later in the evening the day of surgery. Consults: none    Significant Diagnostic Studies:   FL CHOLANGIOGRAM OR   Final Result   No filling defect or evidence of obstruction. US NON OB TRANSVAGINAL   Final Result   Dermoid cyst within the right ovary, consistent with the findings of   09/30/2021. Normal arterial and venous blood flow is identified within the   right ovary. No sonographic evidence of torsion. Please see below for   specific recommendations involving the dermoid cyst.      Small uterine fibroid measuring up to 2.7 cm. Normal sonographic appearance of the left ovary, uterus, and endometrium for   patient age. RECOMMENDATIONS:   Multiple ovarian cysts. Most severe: 2 cm dermoid ovarian cyst.  Recommend   pelvic MRI w/IV contrast. If not surgically resected, pelvic US follow-up   annually. Reference: Radiology 2010 Sep;256(3):943-54         US PELVIS COMPLETE   Final Result   Dermoid cyst on the right measuring 1.9 cm maximally as noted on the earlier   CT. Surgical consultation recommended. Continued surveillance is   recommended annually if not surgically resected. Posterior fundal fibroid measuring 2.7 cm. Thickened endometrial stripe   possibly related to the stage of menstruation. Reportedly previous left oophorectomy with possible remnant tissue on the   left.   Physiologic free fluid in the cul-de-sac. RECOMMENDATIONS:   Multiple ovarian cysts. Most severe: 1.9 cm dermoid ovarian cyst.  Recommend   pelvic MRI w/IV contrast. If not surgically resected, pelvic US follow-up   annually. Reference: Radiology 2010 Sep;256(3):357-41         CT ABDOMEN PELVIS W IV CONTRAST Additional Contrast? None   Final Result   No CT evidence of acute intra-abdominal pathology. 1.8 cm right dermoid cyst.  Further evaluation with pelvic ultrasound and   gynecologic consult is recommended. Discharge Exam:  General/Appearance: NAD, alert oriented x3  HEENT: NCAT, PERRLA, Conjunctiva non injected, no scleral icterus. External ears and nares normal bilaterally. Mucous membranes pink and moist.   Neck: Neck is symmetrical. Trachea appears midline. Lung: clear to auscultation bilaterally, normal rate and effort  Cardiac: regular rate and rhythm  Abdomen: soft, ND. Incisions clean dry and intact, incisional tenderness. Neuro: No gross motor or sensory deficits. Skin: No open wounds or rashes. MSK: normal ROM, no edema  Psych: normal insight, judgment  Disposition: home    In process/preliminary results:  Outstanding Order Results     No orders found from 9/1/2021 to 10/1/2021. Patient Instructions:   Discharge Medication List as of 10/1/2021  6:36 PM      START taking these medications    Details   oxyCODONE (ROXICODONE) 5 MG immediate release tablet Take 1 tablet by mouth every 6 hours as needed for Pain for up to 5 days. Intended supply: 5 days.  Take lowest dose possible to manage pain, Disp-20 tablet, R-0Print         CONTINUE these medications which have NOT CHANGED    Details   clarithromycin (BIAXIN) 500 MG tablet Take 500 mg by mouth 2 times dailyHistorical Med      omeprazole (PRILOSEC) 40 MG delayed release capsule Take 40 mg by mouth dailyHistorical Med      metroNIDAZOLE (FLAGYL) 500 MG tablet Take 500 mg by mouth 2 times dailyHistorical Med      ondansetron (ZOFRAN) 4 MG tablet Take 1 tablet by mouth every 8 hours as needed for Nausea, Disp-20 tablet, R-0Print      HYDROcodone-acetaminophen (NORCO) 5-325 MG per tablet Take 1 tablet by mouth every 4 hours as needed for Pain for up to 3 days. Intended supply: 3 days. Take lowest dose possible to manage pain, Disp-18 tablet, R-0Print         STOP taking these medications       ALPRAZolam (XANAX) 1 MG tablet Comments:   Reason for Stopping:         amphetamine-dextroamphetamine (ADDERALL) 30 MG tablet Comments:   Reason for Stopping:             Activity: activity as tolerated, no driving while on analgesics and no heavy lifting for 6 weeks  Diet: regular diet low-fat  Wound Care: keep wound clean and dry and as directed    Follow-up with Dr. Ambrosio Owens in 2 weeks.     SignedJohn [facility] APRN-CNP 1:52 PM 10/6/2021   [facility]  Office: [phone]   10/6/2021  1:50 PM

## 2021-10-28 ENCOUNTER — OFFICE VISIT (OUTPATIENT)
Dept: SURGERY | Age: 36
End: 2021-10-28

## 2021-10-28 VITALS — WEIGHT: 145 LBS | BODY MASS INDEX: 22.71 KG/M2

## 2021-10-28 DIAGNOSIS — K81.9 CHOLECYSTITIS: Primary | ICD-10-CM

## 2021-10-28 PROCEDURE — 99024 POSTOP FOLLOW-UP VISIT: CPT | Performed by: SURGERY

## 2021-10-28 NOTE — PROGRESS NOTES
Subjective:      Patient ID: Aliza Grubbs is a 31 y.o. female. HPI  S/p lap becca with gram one month ago. Covid + 2 weeks postop with fevers delaying postop recovery. Appetite finally normal the last few days. C/o some loose stools, no inciting diet that she can pin point. Pain resolved. No apaprent complications    Path  Gallbladder, cholecystectomy:   - Chronic cholecystitis, moderate. Review of Systems    Objective:   Physical Exam  Wounds healed  Abdomen nontender  Assessment:       Diagnosis Orders   1. Cholecystitis             Plan:      Recovering fairly well  Monitor appetite and loose stools now that recovered from Covid.     If worsens call in a few weeks  Path discussed  No diet or activity restrictions  Return PRN        Angelito Rodrigues MD

## 2021-10-28 NOTE — LETTER
[facility] Vascular Surgery  [id number]  [facility] [address]  Phone: [phone]  Fax: [phone]    Nina Lindquist MD        October 28, 2021     Patient: Lindsey Sterling   YOB: 1985   Date of Visit: 10/28/2021       To Whom It May Concern: It is my medical opinion that Lindsey Sterling may return to work on November 1, 2021 without restrictions. If you have any questions or concerns, please don't hesitate to call.     Sincerely,        Nina Lindquist MD

## (undated) DEVICE — SYRINGE 20ML LL S/C 50

## (undated) DEVICE — TISSUE RETRIEVAL SYSTEM: Brand: INZII RETRIEVAL SYSTEM

## (undated) DEVICE — SOLUTION IV IRRIG POUR BRL 0.9% SODIUM CHL 2F7124

## (undated) DEVICE — SUTURE VCRL SZ 4-0 L18IN ABSRB UD L19MM PS-2 3/8 CIR PRIM J496H

## (undated) DEVICE — COVER LT HNDL BLU PLAS

## (undated) DEVICE — Device

## (undated) DEVICE — SET INSUF TUBE HEAT ISO CONN DISP

## (undated) DEVICE — TROCAR: Brand: KII SHIELDED BLADED ACCESS SYSTEM

## (undated) DEVICE — ADTEC SINGLE USE HOOK SCISSORS, SHAFT ONLY, MONOPOLAR, STRAIGHT, WORKING LENGTH: 12 1/4", (310 MM), DIAM. 5 MM, BLUNT/BLUNT, INSULATED, SINGLE ACTION, STERILE, DISPOSABLE, PACKAGE OF 10 PIECES: Brand: AESCULAP

## (undated) DEVICE — GLOVE ORANGE PI 7   MSG9070

## (undated) DEVICE — ADHESIVE SKIN CLOSURE TOP 36 CC HI VISC DERMBND MINI

## (undated) DEVICE — GLOVE ORANGE PI 7 1/2   MSG9075

## (undated) DEVICE — TROCAR: Brand: KII SLEEVE

## (undated) DEVICE — DBD-DRAPE,LAP,CHOLE,W/TROUGHS,STERILE: Brand: MEDLINE

## (undated) DEVICE — INDICATED FOR USE DURING OPEN AND LAPAROSCOPIC CHOLECYSTECTOMY PROCEDURES TO INJECT RADIOPAQUE MEDIA THROUGH THE CYSTIC DUCT INTO THE BILIARY TREE.: Brand: AEROSTAT®

## (undated) DEVICE — INSUFFLATION NEEDLE TO ESTABLISH PNEUMOPERITONEUM.: Brand: INSUFFLATION NEEDLE

## (undated) DEVICE — GARMENT COMPR STD FOR 17IN CALF UNIF THER FLOTRN

## (undated) DEVICE — PMI DISPOSABLE PUNCTURE CLOSURE DEVICE / SUTURE GRASPER: Brand: PMI

## (undated) DEVICE — APPLIER CLP M L L11.4IN DIA10MM ENDOSCP ROT MULT FOR LIG

## (undated) DEVICE — C-ARM: Brand: UNBRANDED

## (undated) DEVICE — SYRINGE MED 30ML STD CLR PLAS LUERLOCK TIP N CTRL DISP

## (undated) DEVICE — SUTURE SZ 0 27IN 5/8 CIR UR-6  TAPER PT VIOLET ABSRB VICRYL J603H

## (undated) DEVICE — GENERAL LAPAROSCOPY: Brand: MEDLINE INDUSTRIES, INC.

## (undated) DEVICE — GOWN SIRUS NONREIN XL W/TWL: Brand: MEDLINE INDUSTRIES, INC.